# Patient Record
Sex: FEMALE | Race: WHITE | NOT HISPANIC OR LATINO | Employment: FULL TIME | URBAN - METROPOLITAN AREA
[De-identification: names, ages, dates, MRNs, and addresses within clinical notes are randomized per-mention and may not be internally consistent; named-entity substitution may affect disease eponyms.]

---

## 2017-05-11 ENCOUNTER — ALLSCRIPTS OFFICE VISIT (OUTPATIENT)
Dept: OTHER | Facility: OTHER | Age: 42
End: 2017-05-11

## 2017-05-15 ENCOUNTER — GENERIC CONVERSION - ENCOUNTER (OUTPATIENT)
Dept: OTHER | Facility: OTHER | Age: 42
End: 2017-05-15

## 2017-06-12 ENCOUNTER — GENERIC CONVERSION - ENCOUNTER (OUTPATIENT)
Dept: OTHER | Facility: OTHER | Age: 42
End: 2017-06-12

## 2017-07-10 ENCOUNTER — OFFICE VISIT (OUTPATIENT)
Dept: URGENT CARE | Facility: CLINIC | Age: 42
End: 2017-07-10
Payer: COMMERCIAL

## 2017-07-10 PROCEDURE — 99213 OFFICE O/P EST LOW 20 MIN: CPT

## 2017-09-14 ENCOUNTER — GENERIC CONVERSION - ENCOUNTER (OUTPATIENT)
Dept: OTHER | Facility: OTHER | Age: 42
End: 2017-09-14

## 2017-11-17 ENCOUNTER — TRANSCRIBE ORDERS (OUTPATIENT)
Dept: ADMINISTRATIVE | Facility: HOSPITAL | Age: 42
End: 2017-11-17

## 2017-11-17 DIAGNOSIS — J32.9 CHRONIC SINUSITIS, UNSPECIFIED LOCATION: Primary | ICD-10-CM

## 2017-11-22 ENCOUNTER — HOSPITAL ENCOUNTER (OUTPATIENT)
Dept: CT IMAGING | Facility: CLINIC | Age: 42
Discharge: HOME | End: 2017-11-22
Payer: COMMERCIAL

## 2017-11-22 DIAGNOSIS — J32.9 CHRONIC SINUSITIS, UNSPECIFIED LOCATION: ICD-10-CM

## 2017-11-22 PROCEDURE — 70486 CT MAXILLOFACIAL W/O DYE: CPT

## 2018-01-13 NOTE — MISCELLANEOUS
Message   Recorded as Task   Date: 01/30/2016 08:40 AM, Created By: Leigh Ann Ocampo   Task Name: Go to Result   Assigned To: Merline Estrada   Regarding Patient: AURELIO Ndiaye, Status: Active   Comment:    Fe Hong - 30 Jan 2016 8:40 AM     TASK CREATED  Please let the patient know that her chest x-ray was normal    02/01/2016 Patient notified  trb      Active Problems    1  Acute maxillary sinusitis (461 0) (J01 00)   2  Chronic pansinusitis (473 8) (J32 4)   3  Chronic sinusitis (473 9) (J32 9)   4  Cough variant asthma (493 82) (J45 991)   5  Eczema (692 9) (L30 9)   6  Esophageal reflux (530 81) (K21 9)   7  History of food allergy (V15 05) (Z91 018)    Current Meds   1  Doxycycline Hyclate 100 MG Oral Capsule; TAKE 1 CAPSULE TWICE DAILY WITH   MEALS; Therapy: 66VTC5520 to (Complete:73Jnh1710)  Requested for: 05QXE2856; Last   Rx:28Jan2016 Ordered   2  EpiPen 2-Crescencio 0 3 MG/0 3ML CRYSTAL; USE AS DIRECTED; Therapy: 77OQV8665 to (Last Rx:06Mar2014)  Requested for: 61LKR6544 Ordered   3  Fluticasone Propionate 50 MCG/ACT Nasal Suspension; USE 2 SPRAYS IN EACH   NOSTRIL ONCE DAILY; Therapy: 53XCJ4717 to (Evaluate:12Lmk3581); Last Rx:28Jan2016 Ordered   4  Mometasone Furoate 0 1 % External Cream (Elocon); APPLY SPARINGLY TO   AFFECTED AREAS TWICE DAILY  (AM AND PM); Therapy: 99ETJ2655 to (Evaluate:71Mds3387)  Requested for: 61UZO4149; Last   Rx:05Dec2015 Ordered   5  Qvar 40 MCG/ACT Inhalation Aerosol Solution; INHALE 2 PUFFS TWICE DAILY; Therapy: 06OXG2139 to (Last Rx:28Jan2016)  Requested for: 75ORR5008 Ordered   6  Ventolin  (90 Base) MCG/ACT Inhalation Aerosol Solution; INHALE 2 PUFFS   EVERY 4-6 HOURS AS NEEDED; Therapy: 55MCJ4605 to (Last Rx:28Jan2016)  Requested for: 39SKN2695 Ordered    Allergies    1  Aspirin TABS   2  Bacitracin OINT   3  Neosporin OINT   4  Penicillins    5  Eggs   6  Nuts   7  Shellfish    Signatures   Electronically signed by :  Kirill Redman, ; Feb 1 2016  5:21PM EST (Author)

## 2018-01-14 VITALS
SYSTOLIC BLOOD PRESSURE: 124 MMHG | TEMPERATURE: 99.5 F | WEIGHT: 152 LBS | HEIGHT: 62 IN | DIASTOLIC BLOOD PRESSURE: 64 MMHG | BODY MASS INDEX: 27.97 KG/M2 | HEART RATE: 68 BPM

## 2018-01-15 NOTE — MISCELLANEOUS
Requested for: 88Dcj9609 Ordered   6  ZyrTEC-D Allergy & Congestion 5-120 MG Oral Tablet Extended Release 12 Hour; TAKE 1   TABLET DAILY; Therapy: 25APK7920 to (Evaluate:66Pae1623); Last Rx:17Kjv7581 Ordered    Allergies    1  Aspirin TABS   2  Bacitracin OINT   3  Neosporin OINT   4  Penicillins    5  Eggs   6  Nuts   7   Shellfish    Signatures   Electronically signed by : Vicente Gonzalez DO; Oct  3 2016 11:06PM EST                       (Author)

## 2018-01-16 NOTE — PROGRESS NOTES
Assessment    1  Encounter for preventive health examination (V70 0) (Z00 00)    Plan  Allergic rhinitis, Moderate persistent asthma    · Fluticasone Propionate 50 MCG/ACT Nasal Suspension; USE 2 SPRAYS IN EACH  NOSTRIL ONCE DAILY   · Medrol 4 MG Oral Tablet Therapy Pack (MethylPREDNISolone); Take as directed  on pack instructions   · Symbicort 160-4 5 MCG/ACT Inhalation Aerosol; INHALE 2 PUFFS TWICE DAILY  RINSE MOUTH AFTER USE   · Tuan Bailey MD, Marcia Rodriges  (Allergy/Immunology) Physician Referral  Consult  Status: Active   Requested for: 42CCX8410  Care Summary provided  : Yes  Cough variant asthma, Moderate persistent asthma    · Qvar 80 MCG/ACT Inhalation Aerosol Solution  Dry skin dermatitis, Eczema    · 2 - Padmini RICHARDS, Sarahi Baez (Dermatology) Physician Referral  Consult  Status: Active   Requested for: 40RAX7386  Care Summary provided  : Yes  Eczema    · Mometasone Furoate 0 1 % External Cream (Elocon); APPLY SPARINGLY TO  AFFECTED AREAS TWICE DAILY  (AM AND PM)  Encounter for screening mammogram for breast cancer    · MAMMO SCREENING BILATERAL W 3D & CAD; Status:Active; Requested for:51Zkg3476; Health Maintenance    · EKG/ECG- POC; Status:Complete;   Done: 15LQB9138 09:19PM  Moderate persistent asthma    · Ventolin  (90 Base) MCG/ACT Inhalation Aerosol Solution; INHALE 2  PUFFS EVERY 4-6 HOURS AS NEEDED   · Follow-up visit in 3 weeks Evaluation and Treatment  Follow-up  Status: Hold For -  Scheduling  Requested for: 73Ego3295    Discussion/Summary  health maintenance visit Currently, she eats a healthy diet and has an adequate exercise regimen  the risks and benefits of cervical cancer screening were discussed cervical cancer screening is current cervical cancer screening is managed by Dr Ludivina Rdz Breast cancer screening: the risks and benefits of breast cancer screening were discussed and mammogram has been ordered   The risks and benefits of immunizations were discussed and patient declines immunizations  She was advised to be evaluated by an optometrist and a dentist  Advice and education were given regarding nutrition, weight bearing exercise, weight loss, calcium supplements, vitamin D supplements, sunscreen use, self skin examination, helmet use, seat belt use, fall risk reduction and advanced directive planning  The patient had a normal exam today in the office  Her EKG was normal  Her lab work was all normal as well  She will continue with her healthy diet and exercise  She is having worsening asthma and allergy symptoms  I'm going to start her on the Medrol Dosepak to control her acute allergy flare  We will change her maintenance inhaler to Symbicort as ordered  We will also restart her steroid nasal spray  I'm going to refer her to an allergist for further evaluation and treatment  We'll see her back as scheduled  Possible side effects of new medications were reviewed with the patient/guardian today  Chief Complaint  Complete Physical 45year old female -- Sees GYN for PAP/Pelvics      History of Present Illness  HM, Adult Female: The patient is being seen for a health maintenance evaluation  General Health: The patient's health since the last visit is described as good  She has regular dental visits  She denies vision problems  Vision care includes no need for vision correction  She denies hearing loss  Lifestyle:  She consumes a diverse and healthy diet  She does not have any weight concerns  She exercises regularly  She exercises less than three times a week  Reproductive health: the patient is premenopausal   she reports normal menses  Screening:   HPI: Figueroa Schafer is a 28-year-old female presents today for her complete physical  She has been having increased asthma symptoms over the last few weeks- it is worse in the humidity and around her dog  She has some nasal congestion and congestion  She will have to use the rescue inhaler several times to get it to work   She is not remembering to use her Qvar every day  She is having increased nighttime symptoms  She has not seen an allergist in the past  She did have allergy testing through ENT via the lab work in the past  She has severe nasal congestion  There are no fevers  She uses nasal spray  She was given prescription Flonase through her ENT  The patient denies any chest pain, shortness of breath, or palpitations  There is no edema  There are no headaches or visual changes  There is no lightheadedness, dizziness, or fainting spells  She sees her GYN regularly and she is due to see them  She sees ENT and she needs sinus surgery at some point  She sees Dr Raymond Fowler  Review of Systems    Constitutional: as noted in HPI  Eyes: as noted in HPI    ENT: as noted in HPI  Cardiovascular: as noted in HPI  Respiratory: as noted in HPI  Gastrointestinal: as noted in HPI  Genitourinary: as noted in HPI  Active Problems    1  Allergic rhinitis (477 9) (J30 9)   2  Blood tests for routine general physical examination (V72 62) (Z00 00)   3  Body mass index (BMI) of 25 0 to 29 9 (278 02) (E66 3)   4  Chronic pansinusitis (473 8) (J32 4)   5  Eczema (692 9) (L30 9)   6  Esophageal reflux (530 81) (K21 9)   7  History of food allergy (V15 05) (Z91 018)   8   Moderate persistent asthma (493 90) (J45 40)    Past Medical History    · History of Acute maxillary sinusitis (461 0) (J01 00)   · History of Cervical cancer screening (V76 2) (Z12 4)   · History of Encounter for routine pelvic examination (V72 31) (Z01 419)   · History of acute sinusitis (V12 69) (Z87 09)   · History of sinusitis (V12 69) (Z87 09)   · History of Influenza vaccine needed (V04 81) (Z23)   · History of Need for pneumococcal vaccine (V03 82) (Z23)    Surgical History    · Denied: History Of Prior Surgery    Family History  Mother    · Family history of Diabetes    Social History    · Daily caffeinated coffee consumption   · Former smoker (V15 82) (N91 220)   · quit 11/13/2015  ·    · No drug use   · Occasional alcohol use   · Three children    Current Meds   1  Qvar 80 MCG/ACT Inhalation Aerosol Solution; INHALE 2 PUFFS,  BY MOUTH, TWICE   DAILY; Therapy: 12HCA6553 to (Last Rx:48Omn1935)  Requested for: 07GVF6798 Ordered   2  Ventolin  (90 Base) MCG/ACT Inhalation Aerosol Solution; INHALE 2 PUFFS   EVERY 4-6 HOURS AS NEEDED; Therapy: 34UNP3418 to (Last Rx:28Jan2016)  Requested for: 43IHE8802 Ordered   3  ZyrTEC-D Allergy & Congestion 5-120 MG Oral Tablet Extended Release 12 Hour; TAKE 1   TABLET DAILY; Therapy: 77ORG7098 to (Evaluate:08Nov2016); Last Rx:79Mnv3681 Ordered    Allergies    1  Aspirin TABS   2  Bacitracin OINT   3  Neosporin OINT   4  Penicillins    5  Eggs   6  Nuts   7  Shellfish    Vitals   Recorded: 45Oqr6566 07:19PM Recorded: 50BZG5381 92:53CS   Systolic 835 819, LUE, Sitting   Diastolic 80 90, LUE, Sitting   Heart Rate 68 78, L Radial   Pulse Quality  Regular   Temperature  99 6 F, Tympanic   LMP  23-Aug-2016   O2 Saturation 98, RA    Height  5 ft 2 in   Weight  154 lb    BMI Calculated  28 17   BSA Calculated  1 71     Physical Exam    Constitutional   General appearance: No acute distress, well appearing and well nourished  Eyes   Conjunctiva and lids: No swelling, erythema or discharge  Pupils and irises: Equal, round, reactive to light  Ophthalmoscopic examination: Normal fundi and optic discs  Ears, Nose, Mouth, and Throat   External inspection of ears and nose: Normal     Otoscopic examination: Tympanic membranes translucent with normal light reflex  Canals patent without erythema  Hearing: Normal     Nasal mucosa, septum, and turbinates: Normal without edema or erythema  Lips, teeth, and gums: Normal, good dentition  Oropharynx: Normal with no erythema, edema, exudate or lesions  Neck   Neck: Supple, symmetric, trachea midline, no masses  Thyroid: Normal, no thyromegaly      Pulmonary   Respiratory effort: No increased work of breathing or signs of respiratory distress  Percussion of chest: Normal     Palpation of chest: Normal     Auscultation of lungs: Clear to auscultation  Auscultation of the lungs revealed no expiratory wheezing, normal expiratory time and no inspiratory wheezing  no rales or crackles were heard bilaterally  no rhonchi  no friction rub  no wheezing  no diminished breath sounds  no bronchial breath sounds  Cardiovascular   Palpation of heart: Normal PMI, no thrills  Auscultation of heart: Normal rate and rhythm, normal S1 and S2, no murmurs  The heart rate was normal  Heart sounds: normal S1, normal S2, no S3 and no S4  no murmurs were heard  Carotid pulses: 2+ bilaterally  Abdominal aorta: Normal     Femoral pulses: 2+ bilaterally  Pedal pulses: 2+ bilaterally  Examination of extremities for edema and/or varicosities: Normal     Chest   Breasts: Normal, no dimpling or skin changes appreciated  Palpation of breasts and axillae: Normal, no masses palpated  Abdomen   Abdomen: Non-tender, no masses  Bowel sounds were normal  The abdomen was soft and nontender  no masses palpated  Liver and spleen: No hepatomegaly or splenomegaly  Lymphatic   Palpation of lymph nodes in neck: No lymphadenopathy  Palpation of lymph nodes in axillae: No lymphadenopathy  Palpation of lymph nodes in groin: No lymphadenopathy  Palpation of lymph nodes in other areas: No lymphadenopathy  Musculoskeletal   Gait and station: Normal     Digits and nails: Normal without clubbing or cyanosis  Joints, bones, and muscles: Normal     Range of motion: Normal     Stability: Normal     Muscle strength/tone: Normal     Skin   Skin and subcutaneous tissue: Normal without rashes or lesions  Palpation of skin and subcutaneous tissue: Normal turgor  Neurologic   Cranial nerves: Cranial nerves II-XII intact  Reflexes: 2+ and symmetric  Sensation: No sensory loss  Psychiatric   Judgment and insight: Normal     Orientation to person, place, and time: Normal     Recent and remote memory: Intact  Mood and affect: Normal        Results/Data  (1) LIPID PANEL FASTING W DIRECT LDL REFLEX 01Crb0990 09:13PM Danny Keyes     Test Name Result Flag Reference   CHOLESTEROL 82     LDL CHOLESTEROL CALCULATED 108     TRIGLYCERIDES 63     HDL,DIRECT 61       A 12 lead ECG was performed and was normal  No acute ischemia  Rhythm and rate: ventricular rate is 81 beats per minute, but normal sinus rhythm  P-waves: AK interval is normal, but the P wave is normal    QRS: the QRS is normal    ST segment: the ST segments are normal    T waves: normal    Comparison to prior ECGs:  no interval change  Procedure    Procedure: Visual Acuity Test    Indication: routine screening  Inforrmation supplied by a Snellen chart     Results: 20/20 in both eyes without corrective device, 20/20 in the right eye without corrective device, 20/25 in the left eye without corrective device      Future Appointments    Date/Time Provider Specialty Site   10/04/2016 09:00 AM Danny Keyes DO [facility]     Signatures   Electronically signed by : Elijah Appiah DO; Sep 15 2016  9:23PM EST                       (Author)

## 2018-04-09 ENCOUNTER — OFFICE VISIT (OUTPATIENT)
Dept: FAMILY MEDICINE CLINIC | Facility: CLINIC | Age: 43
End: 2018-04-09
Payer: COMMERCIAL

## 2018-04-09 VITALS
HEART RATE: 80 BPM | SYSTOLIC BLOOD PRESSURE: 130 MMHG | WEIGHT: 162 LBS | TEMPERATURE: 99.6 F | BODY MASS INDEX: 30.58 KG/M2 | DIASTOLIC BLOOD PRESSURE: 80 MMHG | HEIGHT: 61 IN

## 2018-04-09 DIAGNOSIS — J03.90 PHARYNGOTONSILLITIS: Primary | ICD-10-CM

## 2018-04-09 DIAGNOSIS — J02.9 PHARYNGOTONSILLITIS: Primary | ICD-10-CM

## 2018-04-09 DIAGNOSIS — Z12.31 ENCOUNTER FOR SCREENING MAMMOGRAM FOR BREAST CANCER: ICD-10-CM

## 2018-04-09 PROBLEM — J01.40 ACUTE PANSINUSITIS, UNSPECIFIED: Status: ACTIVE | Noted: 2017-05-11

## 2018-04-09 PROCEDURE — 99213 OFFICE O/P EST LOW 20 MIN: CPT | Performed by: NURSE PRACTITIONER

## 2018-04-09 PROCEDURE — 87880 STREP A ASSAY W/OPTIC: CPT | Performed by: NURSE PRACTITIONER

## 2018-04-09 RX ORDER — METHYLPREDNISOLONE 4 MG/1
TABLET ORAL
Qty: 21 TABLET | Refills: 0 | Status: SHIPPED | OUTPATIENT
Start: 2018-04-09 | End: 2019-01-23 | Stop reason: ALTCHOICE

## 2018-04-09 RX ORDER — CLARITHROMYCIN 250 MG/1
250 TABLET, FILM COATED ORAL EVERY 12 HOURS SCHEDULED
Qty: 20 TABLET | Refills: 0 | Status: SHIPPED | OUTPATIENT
Start: 2018-04-09 | End: 2018-04-19

## 2018-04-09 RX ORDER — BUDESONIDE AND FORMOTEROL FUMARATE DIHYDRATE 160; 4.5 UG/1; UG/1
2 AEROSOL RESPIRATORY (INHALATION) 2 TIMES DAILY
COMMUNITY
Start: 2016-09-15 | End: 2019-05-08 | Stop reason: SDUPTHER

## 2018-04-09 NOTE — PATIENT INSTRUCTIONS

## 2018-04-09 NOTE — LETTER
April 9, 2018     Patient: Izzy Morton   YOB: 1975   Date of Visit: 4/9/2018       To Whom it May Concern:    Mery Barfield is under my professional care  She was seen in my office on 4/9/2018  She may return to work on 4/11/2018  If you have any questions or concerns, please don't hesitate to call           Sincerely,          ESTEFANIA Luz        CC: No Recipients

## 2018-04-09 NOTE — PROGRESS NOTES
Assessment/Plan   Diagnoses and all orders for this visit:    Pharyngotonsillitis  -     clarithromycin (BIAXIN) 250 mg tablet; Take 1 tablet (250 mg total) by mouth every 12 (twelve) hours for 10 days  -     Methylprednisolone 4 MG TBPK; Use as directed on package    Encounter for screening mammogram for breast cancer  -     Mammo screening bilateral w 3d & cad; Future    Other orders  -     budesonide-formoterol (SYMBICORT) 160-4 5 mcg/act inhaler; Inhale 2 puffs 2 (two) times a day        Chief Complaint   Patient presents with    Sore Throat     Sore throat, chills, cough with yellow mucous, and nasal mucous yellow for 1 week  Subjective   Patient ID: Ananth Leslie is a 40 y o  female  Vitals:    04/09/18 1303   BP: 130/80   Pulse: 80   Temp: 99 6 °F (37 6 °C)     Sore Throat    This is a new problem  Episode onset: 10 days  The problem has been waxing and waning  Neither side of throat is experiencing more pain than the other  Fever duration: chills no official temperture  The pain is at a severity of 4/10  The pain is mild  Associated symptoms include congestion, coughing, ear pain, a hoarse voice and swollen glands  Pertinent negatives include no abdominal pain, diarrhea, drooling, ear discharge, headaches, plugged ear sensation, neck pain, shortness of breath, stridor or trouble swallowing  She has tried cool liquids and acetaminophen for the symptoms  The treatment provided no relief  The following portions of the patient's history were reviewed and updated as appropriate: allergies, current medications, past family history, past social history, past surgical history and problem list     Review of Systems   Constitutional: Positive for chills, fatigue and fever  HENT: Positive for congestion, ear pain, hoarse voice and sore throat  Negative for drooling, ear discharge and trouble swallowing  Eyes: Negative  Negative for visual disturbance  Respiratory: Positive for cough   Negative for chest tightness, shortness of breath and stridor  Cardiovascular: Negative  Gastrointestinal: Negative  Negative for abdominal pain, diarrhea and nausea  Endocrine: Negative  Genitourinary: Negative  Musculoskeletal: Negative  Negative for neck pain  Skin: Negative  Allergic/Immunologic: Negative  Neurological: Negative  Negative for headaches  Hematological: Negative  Psychiatric/Behavioral: Negative  Objective     Physical Exam   Constitutional: She is oriented to person, place, and time  She appears well-developed and well-nourished  No distress  HENT:   Head: Normocephalic  Right Ear: Hearing and external ear normal  No swelling (inner canal redened, large amount of soft brown cerumen removed, canals appear irriitated) or tenderness  No mastoid tenderness  Tympanic membrane is not erythematous and not bulging  No middle ear effusion  Left Ear: Hearing, tympanic membrane and external ear normal  No swelling or tenderness  No mastoid tenderness  Tympanic membrane is not erythematous and not bulging  No middle ear effusion  Nose: Nose normal  No mucosal edema, rhinorrhea or sinus tenderness  Right sinus exhibits no maxillary sinus tenderness and no frontal sinus tenderness  Left sinus exhibits no maxillary sinus tenderness and no frontal sinus tenderness  Mouth/Throat: Uvula is midline  No uvula swelling  Posterior oropharyngeal erythema present  No oropharyngeal exudate or tonsillar abscesses  Tonsils are 3+ on the right  Tonsils are 3+ on the left  No tonsillar exudate  Eyes: Conjunctivae are normal  Pupils are equal, round, and reactive to light  Right eye exhibits no discharge  Left eye exhibits no discharge  Neck: Normal range of motion  Neck supple  No thyromegaly present  Cardiovascular: Normal rate, regular rhythm, normal heart sounds and intact distal pulses  No murmur heard    Pulmonary/Chest: Effort normal and breath sounds normal  No respiratory distress  She has no wheezes  Abdominal: Soft  There is no tenderness  Musculoskeletal: Normal range of motion  She exhibits no edema, tenderness or deformity  Lymphadenopathy:     She has cervical adenopathy (voice hoarse)  Neurological: She is alert and oriented to person, place, and time  Skin: Skin is warm and dry  Capillary refill takes less than 2 seconds  Psychiatric: She has a normal mood and affect   Her behavior is normal  Judgment and thought content normal

## 2018-04-13 ENCOUNTER — HOSPITAL ENCOUNTER (OUTPATIENT)
Dept: MAMMOGRAPHY | Facility: CLINIC | Age: 43
Discharge: HOME | End: 2018-04-13
Payer: COMMERCIAL

## 2018-04-13 DIAGNOSIS — Z12.31 ENCOUNTER FOR SCREENING MAMMOGRAM FOR BREAST CANCER: ICD-10-CM

## 2018-04-13 PROCEDURE — 77067 SCR MAMMO BI INCL CAD: CPT

## 2018-04-13 PROCEDURE — 77063 BREAST TOMOSYNTHESIS BI: CPT

## 2018-04-25 ENCOUNTER — HOSPITAL ENCOUNTER (OUTPATIENT)
Dept: ULTRASOUND IMAGING | Facility: CLINIC | Age: 43
Discharge: HOME | End: 2018-04-25
Payer: COMMERCIAL

## 2018-04-25 ENCOUNTER — HOSPITAL ENCOUNTER (OUTPATIENT)
Dept: MAMMOGRAPHY | Facility: CLINIC | Age: 43
Discharge: HOME | End: 2018-04-25
Payer: COMMERCIAL

## 2018-04-25 DIAGNOSIS — R92.8 ABNORMAL MAMMOGRAM: ICD-10-CM

## 2018-04-25 PROCEDURE — 77066 DX MAMMO INCL CAD BI: CPT

## 2018-04-25 PROCEDURE — G0279 TOMOSYNTHESIS, MAMMO: HCPCS

## 2018-04-25 PROCEDURE — 76642 ULTRASOUND BREAST LIMITED: CPT

## 2018-04-25 RX ORDER — ACETAMINOPHEN 325 MG/1
650 TABLET ORAL EVERY 6 HOURS PRN
COMMUNITY
End: 2019-02-13 | Stop reason: ALTCHOICE

## 2018-04-25 RX ORDER — IBUPROFEN 200 MG
TABLET ORAL EVERY 6 HOURS PRN
COMMUNITY
End: 2019-08-13

## 2018-04-25 NOTE — PROGRESS NOTES
Met with patient and Dr Lopez Nurse regarding recommendation for;      __x___ RIGHT ___x___LEFT      __x(L)___Ultrasound guided  __x(R)____Stereotactic  Breast biopsy  __x___Verbalized understanding        Blood thinners:  _____yes ___x__no    Date stopped: ____n/a_______    Biopsy teaching sheet given:  ____x___yes ______no

## 2018-05-01 ENCOUNTER — HOSPITAL ENCOUNTER (OUTPATIENT)
Dept: MAMMOGRAPHY | Facility: CLINIC | Age: 43
Discharge: HOME | End: 2018-05-01
Payer: COMMERCIAL

## 2018-05-01 ENCOUNTER — HOSPITAL ENCOUNTER (OUTPATIENT)
Dept: MAMMOGRAPHY | Facility: CLINIC | Age: 43
Discharge: HOME | End: 2018-05-01
Admitting: RADIOLOGY
Payer: COMMERCIAL

## 2018-05-01 ENCOUNTER — HOSPITAL ENCOUNTER (OUTPATIENT)
Dept: ULTRASOUND IMAGING | Facility: CLINIC | Age: 43
Discharge: HOME | End: 2018-05-01
Payer: COMMERCIAL

## 2018-05-01 VITALS — HEART RATE: 80 BPM | DIASTOLIC BLOOD PRESSURE: 82 MMHG | SYSTOLIC BLOOD PRESSURE: 130 MMHG

## 2018-05-01 DIAGNOSIS — R92.0 MAMMOGRAPHIC MICROCALCIFICATION: ICD-10-CM

## 2018-05-01 DIAGNOSIS — R92.8 ABNORMAL MAMMOGRAM: ICD-10-CM

## 2018-05-01 DIAGNOSIS — R92.8 ABNORMAL ULTRASOUND OF BREAST: ICD-10-CM

## 2018-05-01 PROCEDURE — 88341 IMHCHEM/IMCYTCHM EA ADD ANTB: CPT | Performed by: PATHOLOGY

## 2018-05-01 PROCEDURE — 19081 BX BREAST 1ST LESION STRTCTC: CPT

## 2018-05-01 PROCEDURE — 88305 TISSUE EXAM BY PATHOLOGIST: CPT | Performed by: PATHOLOGY

## 2018-05-01 PROCEDURE — 88361 TUMOR IMMUNOHISTOCHEM/COMPUT: CPT | Performed by: PATHOLOGY

## 2018-05-01 PROCEDURE — 19083 BX BREAST 1ST LESION US IMAG: CPT

## 2018-05-01 PROCEDURE — 88342 IMHCHEM/IMCYTCHM 1ST ANTB: CPT | Performed by: PATHOLOGY

## 2018-05-01 RX ORDER — LIDOCAINE HYDROCHLORIDE 10 MG/ML
5 INJECTION, SOLUTION INFILTRATION; PERINEURAL ONCE
Status: COMPLETED | OUTPATIENT
Start: 2018-05-01 | End: 2018-05-01

## 2018-05-01 RX ORDER — LIDOCAINE HYDROCHLORIDE AND EPINEPHRINE BITARTRATE 20; .01 MG/ML; MG/ML
10 INJECTION, SOLUTION SUBCUTANEOUS ONCE
Status: COMPLETED | OUTPATIENT
Start: 2018-05-01 | End: 2018-05-01

## 2018-05-01 RX ADMIN — LIDOCAINE HYDROCHLORIDE 5 ML: 10 INJECTION, SOLUTION INFILTRATION; PERINEURAL at 13:00

## 2018-05-01 RX ADMIN — LIDOCAINE HYDROCHLORIDE 5 ML: 10 INJECTION, SOLUTION INFILTRATION; PERINEURAL at 13:42

## 2018-05-01 RX ADMIN — LIDOCAINE HYDROCHLORIDE AND EPINEPHRINE 10 ML: 20; 10 INJECTION, SOLUTION INFILTRATION; PERINEURAL at 13:42

## 2018-05-01 NOTE — PROGRESS NOTES
Procedure type:    __x___ultrasound guided _____stereotactic    Breast:    ___x__Left _____Right    Location:100 6cmfn    Needle:12ga Minor    # of passes:2    Clip: Heart    Performed by: Dr Livier Zuniga  Pressure held for 5 minutes by:  Kayli PhillipsPCA)    Steri Strips:    ___X__yes _____no    Band aid:  ___X__yes_____no    Tape and guaze:    _____yes _X____no    Tolerated procedure:    __X___yes _____no

## 2018-05-01 NOTE — PROGRESS NOTES
Patient arrived via:    _x____ambulatory    _____wheelchair    _____stretcher      Domestic violence screen    ____x__negative______positive    Breast Implants:    _______yes _____x___no

## 2018-05-01 NOTE — PROGRESS NOTES
Ice pack given:     ___x__yes _____no    Discharge instructions signed by patient:    ___x__yes _____no    Discharge instructions given to patient:    __x___yes _____no    Discharged via:    __x___amulatory    _____wheelchair    _____stretcher    Stable on discharge:    ___x__yes ____no

## 2018-05-01 NOTE — DISCHARGE INSTR - LAB
POST LARGE CORE BREAST BIOPSY PATIENT INFORMATION      1  Place an ice pack inside your bra over the top of the dressing every hour for 20 minutes (20 minutes on, 60 minutes off) Do this until bedtime  2  Do not shower or bathe until the following morning       3  You may bathe your breast carefully with the steri-strips in place  Be careful    Not to loosen them  The steri-strips will fall off in 3-5 days  4  You may have mild discomfort, and you may have some bruising where the   Needle entered the skin  This should clear within 5-7 days  5  If you need medicine for discomfort, take acetaminophen products such as   Tylenol  You may also take Advil or Motrin products  6  Do not participate in strenuous activities such as-tennis, aerobics, skiing,  Weight lifting, etc  for 24 hours  Refrain from swimming for 72 hours  7  Wearing a bra for sleeping may be more comfortable for the first 24-48 hours  8  Watch for continued bleeding, pain or fever over 101     For procedures done at the [address] [place] [address] call:  Kj Trujillo RN at [phone] or  Dheeraj Araiza RN at [phone]  After 4 PM call the Interventional Radiology Department at [phone] and ask to speak with the nurse on call  For procedures performed at [address] [place] [address] call: The Radiology Nurse at [phone]    After 4 PM call your physician, or go to the Emergency Department  9          The final results of your biopsy are usually available within one week

## 2018-05-01 NOTE — PROGRESS NOTES
Procedure type:    _____ultrasound guided __x___stereotactic    Breast:    _____Left ____x_Right    Location:    Needle:8ga Revolve    # of passes: 10    Clip: Joe    Performed by: Dr Jen Rose    Pressure held for 5 minutes by:  Moisés PhillipsPCA)    Steri Strips:    __X___yes _____no    Band aid:  __X___yes_____no    Tape and guaze:    _X____yes _____no    Tolerated procedure:    ___X__yes _____no

## 2018-05-02 NOTE — PROGRESS NOTES
Post procedure call completed    Bleeding: _____yes __x___no    Pain: _____yes ____x__no    Redness/Swelling: ______yes ___x___no    Band aid removed: _____yes __x___no    Steri-Strips intact: ___x__yes _____no

## 2018-05-04 ENCOUNTER — TELEPHONE (OUTPATIENT)
Dept: MAMMOGRAPHY | Facility: CLINIC | Age: 43
End: 2018-05-04

## 2018-05-04 NOTE — PROGRESS NOTES
Patient Past Medical History:  Asthma          Allergies:  Aspirin, PCn, Bacitracin, Neosporin, Eggs, Nuts and shellfish        Past Breast History:     Previous Biopsy:   Yes______ No________      Breast: Right____ Left______       Malignant______ Benign_______      Treatments if applicable        Present Breast History:     Right_____x_____    Left______x_______     Density____x(left)_____    Calcifications___x(right)_________   Palpable______________      Patient notified of results on:  5/4/18    Date of Appointment with Surgeon 5/7/18 at [place] [address] with Dr Delores Christensen

## 2018-05-04 NOTE — TELEPHONE ENCOUNTER
The patient is scheduled to see Dr Valarie Dorman, breast surgeon in follow up for the +breast cancer on biopsy- she is scheduled on 05/07/2018 for a consult  This was scheduled by the [facility]- Dr Jordan Garduno advised her of the results

## 2018-05-07 ENCOUNTER — APPOINTMENT (OUTPATIENT)
Dept: LAB | Facility: CLINIC | Age: 43
End: 2018-05-07
Payer: COMMERCIAL

## 2018-05-07 ENCOUNTER — OFFICE VISIT (OUTPATIENT)
Dept: GYNECOLOGIC ONCOLOGY | Facility: CLINIC | Age: 43
End: 2018-05-07

## 2018-05-07 ENCOUNTER — OFFICE VISIT (OUTPATIENT)
Dept: SURGICAL ONCOLOGY | Facility: CLINIC | Age: 43
End: 2018-05-07
Payer: COMMERCIAL

## 2018-05-07 ENCOUNTER — TRANSCRIBE ORDERS (OUTPATIENT)
Dept: LAB | Facility: CLINIC | Age: 43
End: 2018-05-07

## 2018-05-07 VITALS
BODY MASS INDEX: 30.58 KG/M2 | WEIGHT: 162 LBS | TEMPERATURE: 98.1 F | RESPIRATION RATE: 16 BRPM | HEART RATE: 99 BPM | DIASTOLIC BLOOD PRESSURE: 70 MMHG | SYSTOLIC BLOOD PRESSURE: 130 MMHG | HEIGHT: 61 IN

## 2018-05-07 DIAGNOSIS — C50.412 MALIGNANT NEOPLASM OF UPPER-OUTER QUADRANT OF LEFT FEMALE BREAST, UNSPECIFIED ESTROGEN RECEPTOR STATUS (HCC): ICD-10-CM

## 2018-05-07 DIAGNOSIS — C50.412 MALIGNANT NEOPLASM OF UPPER-OUTER QUADRANT OF LEFT FEMALE BREAST, UNSPECIFIED ESTROGEN RECEPTOR STATUS (HCC): Primary | ICD-10-CM

## 2018-05-07 LAB — MISCELLANEOUS LAB TEST RESULT: NORMAL

## 2018-05-07 PROCEDURE — 99245 OFF/OP CONSLTJ NEW/EST HI 55: CPT | Performed by: SURGERY

## 2018-05-07 PROCEDURE — 36415 COLL VENOUS BLD VENIPUNCTURE: CPT

## 2018-05-07 NOTE — PROGRESS NOTES
Status:  Genetic testing pending, estimated turn around time: 2 weeks    Summary:    The patient was seen for genetic counseling regarding possible genetic testing, relative to her personal and family history of breast cancers  Family information was reviewed and a three generation pedigree drawn  The patient was recently diagnosed with breast cancer at the age of 40  The  family history is significant for a paternal aunt who was diagnosed with breast cancer at 62 and a paternal cousin who was diagnosed with breast cancer at 43  Please see pedigree for additional family history details  We reviewed the genetics of cancer, hereditary and familial risks, and the main benefits and limitation of genetic testing for susceptibility to cancer  We discussed hereditary cancer syndromes associated with breast cancers including BRCA1/2  We reviewed cancer risks associated with these genes, options for medical management, and potential risks for family members  Genetic Testing: We reviewed the genetic testing process, insurance concerns, and possible results  The patient elected to pursue genetic testing for genes associated with hereditary breast cancer  Informed consent was obtained and a DNA sample was collected  The sample was sent to [facility] for the STAT breast cancer panel  Test results should be available in approximately 2 weeks  The patient elected to have results disclosed by phone and she will be contacted once results are available

## 2018-05-07 NOTE — PROGRESS NOTES
Breast Consultation-Surgical Oncology     [facility]  [facility] [place]  [address] [place] [address]    Name:  Amparo Tong  YOB: 1975  MRN:  [id number]    Assessment/Plan   Diagnoses and all orders for this visit:    Malignant neoplasm of upper-outer quadrant of left female breast, unspecified estrogen receptor status ([address] )  -     Ambulatory referral to Genetics; Future        Assessment:left breast carcinoma    Plan:genetic testing    HPI: Amparo Tong is a 40y o  year old female who presents with a newly diagnosed left breast cancer  Pt denies any breast lumps, nipple drainage or skin changes  She had an abnormal screening mammogram on 18 which led to bilateral breast biopsies  Right breast biopsy was benign, left breast biopsy was IDC  Surgical treatment to date consisted of n/a  Oncology History:     No history exists  Pertinent reproductive history:  Age at menarche:  16  OB History      Para Term  AB Living    3 3            SAB TAB Ectopic Multiple Live Births                     Obstetric Comments    Age at menarche 16  Age at first pregnancy 22        Age at first live birth:  22  Age at menopause:    Hysterectomy/Oophrectomy:  No  Hormone replacement therapy:  No  Birth control pills:  Yes    Problem List:   Patient Active Problem List   Diagnosis    Acute pansinusitis, unspecified    Dry skin dermatitis    Eczema    Esophageal reflux    Moderate persistent asthma     No past medical history on file  Past Surgical History:   Procedure Laterality Date    BREAST BIOPSY Right 2018    Benign    BREAST BIOPSY Left 2018    IDC     No family history on file  Social History     Social History    Marital status: /Civil Union     Spouse name: N/A    Number of children: N/A    Years of education: N/A     Occupational History    Not on file       Social History Main Topics    Smoking status: Former Smoker    Smokeless tobacco: Never Used    Alcohol use Not on file    Drug use: Unknown    Sexual activity: Not on file     Other Topics Concern    Not on file     Social History Narrative    No narrative on file     Current Outpatient Prescriptions   Medication Sig Dispense Refill    budesonide-formoterol (SYMBICORT) 160-4 5 mcg/act inhaler Inhale 2 puffs 2 (two) times a day      acetaminophen (TYLENOL) 325 mg tablet Take 650 mg by mouth every 6 (six) hours as needed for mild pain      ibuprofen (MOTRIN) 200 mg tablet Take by mouth every 6 (six) hours as needed for mild pain      Methylprednisolone 4 MG TBPK Use as directed on package 21 tablet 0     No current facility-administered medications for this visit  Allergies   Allergen Reactions    Eggs Or Egg-Derived Products Anaphylaxis    Nuts Anaphylaxis    Shellfish Allergy Anaphylaxis    Penicillins Vomiting    Aspirin Rash    Bacitracin Rash    Neomycin-Bacitracin Zn-Polymyx Rash         The following portions of the patient's history were reviewed and updated as appropriate: allergies, current medications, past family history, past medical history, past social history, past surgical history and problem list     Review of Systems:  Review of Systems   Constitutional: Negative  Negative for appetite change and fever  Eyes: Negative  Respiratory: Negative for shortness of breath  Cardiovascular: Negative  Gastrointestinal: Negative  Endocrine: Negative  Genitourinary: Negative  Musculoskeletal: Negative  Negative for arthralgias and myalgias  Skin: Negative  Allergic/Immunologic: Negative  Neurological: Negative  Hematological: Negative  Negative for adenopathy  Does not bruise/bleed easily  Psychiatric/Behavioral: Negative  Physical Exam:  Physical Exam   Constitutional: She is oriented to person, place, and time  She appears well-developed and well-nourished     HENT:   Head: Normocephalic and atraumatic  Cardiovascular: Normal heart sounds  Pulmonary/Chest: Breath sounds normal  Right breast exhibits skin change (ecchymosis at biopsy site)  Right breast exhibits no inverted nipple, no mass, no nipple discharge and no tenderness  Left breast exhibits skin change (well healed biopsy site)  Left breast exhibits no inverted nipple, no mass, no nipple discharge and no tenderness  Abdominal: Soft  Lymphadenopathy:        Right axillary: No pectoral and no lateral adenopathy present  Left axillary: No pectoral and no lateral adenopathy present  Right: No supraclavicular adenopathy present  Left: No supraclavicular adenopathy present  Neurological: She is alert and oriented to person, place, and time  Psychiatric: She has a normal mood and affect  Laboratory:      Pathology revealed: invasive ductal carcinoma    Histologic grade: high grade     Angiolymphatic invasion:  absent    Tumor node status: clinically Negative    Hormone receptor status:  pending        Imagin18 3D Rashid screening mammogram,  B0 (3)  18 3D Rashid dx mammogram, left breast US, B4 (3)- Right calcs and left persistent density with suspicious mass  18 right breast stereotactic breast biopsy, benign  18 left breast US guided core biopsy, IDC        Treatment options include:  Lumpectomy vs mastectomy    Discussion/Summary:  61-year-old female who presents today with a newly diagnosed carcinoma of the left breast   This was detected on a baseline screening mammogram   She does not have any family history of breast cancer  She denies any masses or other concerns  Her exam is non revealing today  I reviewed these findings with her and her  who accompanies her  Based on her examination and imaging, she would be a good candidate for breast conservation    However, given her young age, I am recommending that she meet with our genetic counselor to discuss genetic testing  She is indeed interested in doing this  Should she be a gene carrier, we discussed the role of mastectomy as well as contralateral prophylactic mastectomy  In this situation, I would refer her to plastic surgery to discuss reconstruction  She understands that if she does have breast conservation this would include radiation therapy  We briefly discussed the different radiation modalities  She also understands that in addition to any surgery and radiation that she would need adjuvant medical therapy, which will be dictated by her surgical pathology and receptor status  All of her questions were answered today  I will make arrangements for her genetic counseling session  Once I receive these results, I will call her and we will then discuss definitive surgical plans

## 2018-05-07 NOTE — LETTER
May 7, 2018     Leigh Monet, [address] [address]    Patient: Marylen Bending   YOB: 1975   Date of Visit: 2018       Dear Dr Deniz Galarza: Thank you for referring Karolynn Essex to me for evaluation  Below are my notes for this consultation  If you have questions, please do not hesitate to call me  I look forward to following your patient along with you  Sincerely,        Raj Hernandez MD        CC: No Recipients  Raj Hernandez MD  2018  9:04 AM  Sign at close encounter    Breast Consultation-Surgical Oncology     [address]  [address]    Name:  Marylen Bending  YOB: 1975  MRN:  [id number]    Assessment/Plan   Diagnoses and all orders for this visit:    Malignant neoplasm of upper-outer quadrant of left female breast, unspecified estrogen receptor status ([facility] [address] )  -     Ambulatory referral to Genetics; Future        Assessment:left breast carcinoma    Plan:genetic testing    HPI: Marylen Bending is a 40y o  year old female who presents with a newly diagnosed left breast cancer  Pt denies any breast lumps, nipple drainage or skin changes  She had an abnormal screening mammogram on 18 which led to bilateral breast biopsies  Right breast biopsy was benign, left breast biopsy was IDC  Surgical treatment to date consisted of n/a  Oncology History:     No history exists  Pertinent reproductive history:  Age at menarche:  16  OB History      Para Term  AB Living    3 3            SAB TAB Ectopic Multiple Live Births                     Obstetric Comments    Age at menarche 16    Age at first pregnancy 22        Age at first live birth:  22  Age at menopause:    Hysterectomy/Oophrectomy:  No  Hormone replacement therapy:  No  Birth control pills:  Yes    Problem List:   Patient Active Problem List   Diagnosis    Acute pansinusitis, unspecified  Dry skin dermatitis    Eczema    Esophageal reflux    Moderate persistent asthma     No past medical history on file  Past Surgical History:   Procedure Laterality Date    BREAST BIOPSY Right 05/01/2018    Benign    BREAST BIOPSY Left 05/01/2018    IDC     No family history on file  Social History     Social History    Marital status: /Civil Union     Spouse name: N/A    Number of children: N/A    Years of education: N/A     Occupational History    Not on file  Social History Main Topics    Smoking status: Former Smoker    Smokeless tobacco: Never Used    Alcohol use Not on file    Drug use: Unknown    Sexual activity: Not on file     Other Topics Concern    Not on file     Social History Narrative    No narrative on file     Current Outpatient Prescriptions   Medication Sig Dispense Refill    budesonide-formoterol (SYMBICORT) 160-4 5 mcg/act inhaler Inhale 2 puffs 2 (two) times a day      acetaminophen (TYLENOL) 325 mg tablet Take 650 mg by mouth every 6 (six) hours as needed for mild pain      ibuprofen (MOTRIN) 200 mg tablet Take by mouth every 6 (six) hours as needed for mild pain      Methylprednisolone 4 MG TBPK Use as directed on package 21 tablet 0     No current facility-administered medications for this visit  Allergies   Allergen Reactions    Eggs Or Egg-Derived Products Anaphylaxis    Nuts Anaphylaxis    Shellfish Allergy Anaphylaxis    Penicillins Vomiting    Aspirin Rash    Bacitracin Rash    Neomycin-Bacitracin Zn-Polymyx Rash         The following portions of the patient's history were reviewed and updated as appropriate: allergies, current medications, past family history, past medical history, past social history, past surgical history and problem list     Review of Systems:  Review of Systems   Constitutional: Negative  Negative for appetite change and fever  Eyes: Negative  Respiratory: Negative for shortness of breath      Cardiovascular: Negative  Gastrointestinal: Negative  Endocrine: Negative  Genitourinary: Negative  Musculoskeletal: Negative  Negative for arthralgias and myalgias  Skin: Negative  Allergic/Immunologic: Negative  Neurological: Negative  Hematological: Negative  Negative for adenopathy  Does not bruise/bleed easily  Psychiatric/Behavioral: Negative  Physical Exam:  Physical Exam   Constitutional: She is oriented to person, place, and time  She appears well-developed and well-nourished  HENT:   Head: Normocephalic and atraumatic  Cardiovascular: Normal heart sounds  Pulmonary/Chest: Breath sounds normal  Right breast exhibits skin change (ecchymosis at biopsy site)  Right breast exhibits no inverted nipple, no mass, no nipple discharge and no tenderness  Left breast exhibits skin change (well healed biopsy site)  Left breast exhibits no inverted nipple, no mass, no nipple discharge and no tenderness  Abdominal: Soft  Lymphadenopathy:        Right axillary: No pectoral and no lateral adenopathy present  Left axillary: No pectoral and no lateral adenopathy present  Right: No supraclavicular adenopathy present  Left: No supraclavicular adenopathy present  Neurological: She is alert and oriented to person, place, and time  Psychiatric: She has a normal mood and affect         Laboratory:      Pathology revealed: invasive ductal carcinoma    Histologic grade: high grade     Angiolymphatic invasion:  absent    Tumor node status: clinically Negative    Hormone receptor status:  pending        Imagin18 3D Rashid screening mammogram,  B0 (3)  18 3D Rashid dx mammogram, left breast US, B4 (3)- Right calcs and left persistent density with suspicious mass  18 right breast stereotactic breast biopsy, benign  18 left breast US guided core biopsy, IDC        Treatment options include:  Lumpectomy vs mastectomy    Discussion/Summary:  16-year-old female who presents today with a newly diagnosed carcinoma of the left breast   This was detected on a baseline screening mammogram   She does not have any family history of breast cancer  She denies any masses or other concerns  Her exam is non revealing today  I reviewed these findings with her and her  who accompanies her  Based on her examination and imaging, she would be a good candidate for breast conservation  However, given her young age, I am recommending that she meet with our genetic counselor to discuss genetic testing  She is indeed interested in doing this  Should she be a gene carrier, we discussed the role of mastectomy as well as contralateral prophylactic mastectomy  In this situation, I would refer her to plastic surgery to discuss reconstruction  She understands that if she does have breast conservation this would include radiation therapy  We briefly discussed the different radiation modalities  She also understands that in addition to any surgery and radiation that she would need adjuvant medical therapy, which will be dictated by her surgical pathology and receptor status  All of her questions were answered today  I will make arrangements for her genetic counseling session  Once I receive these results, I will call her and we will then discuss definitive surgical plans

## 2018-05-14 ENCOUNTER — DOCUMENTATION (OUTPATIENT)
Dept: GYNECOLOGIC ONCOLOGY | Facility: CLINIC | Age: 43
End: 2018-05-14

## 2018-05-14 NOTE — PROGRESS NOTES
Genetic Testing Results  Genetic testing results are NEGATIVE for mutations and large rearrangements in:  BEBETO, BRCA1, BRCA2, CDH1, CHEK2, PALB2, PTEN, STK11, and TP53 ([facility] Breast [facility] STAT panel)      Discussion  Genetic testing results were disclosed to the patient  No mutations were identified in any of the hereditarybreast cancer genes tested  We discussed that while reassuring, these results do not explain the history of cancer in the family, and it is possible that an unidentifiable mutation or a mutation in another gene(s) is leading to an increased risk of cancer in the family  Therefore it is important that the patient and family members maintain regular cancer screening as directed by their physicians  The patients questions were answered and she was encouraged to call with any future questions or concerns

## 2018-07-12 ENCOUNTER — OFFICE VISIT (OUTPATIENT)
Dept: URGENT CARE | Facility: CLINIC | Age: 43
End: 2018-07-12
Payer: COMMERCIAL

## 2018-07-12 VITALS
BODY MASS INDEX: 30.21 KG/M2 | SYSTOLIC BLOOD PRESSURE: 122 MMHG | DIASTOLIC BLOOD PRESSURE: 78 MMHG | WEIGHT: 160 LBS | HEART RATE: 82 BPM | HEIGHT: 61 IN | OXYGEN SATURATION: 98 % | TEMPERATURE: 99.9 F | RESPIRATION RATE: 16 BRPM

## 2018-07-12 DIAGNOSIS — B02.9 HERPES ZOSTER WITHOUT COMPLICATION: Primary | ICD-10-CM

## 2018-07-12 PROCEDURE — G0382 LEV 3 HOSP TYPE B ED VISIT: HCPCS | Performed by: PREVENTIVE MEDICINE

## 2018-07-12 PROCEDURE — S9083 URGENT CARE CENTER GLOBAL: HCPCS | Performed by: PREVENTIVE MEDICINE

## 2018-07-12 RX ORDER — VALACYCLOVIR HYDROCHLORIDE 500 MG/1
1000 TABLET, FILM COATED ORAL 3 TIMES DAILY
Qty: 21 TABLET | Refills: 0 | Status: SHIPPED | OUTPATIENT
Start: 2018-07-12 | End: 2019-01-23 | Stop reason: ALTCHOICE

## 2018-07-12 NOTE — PROGRESS NOTES
[facility]        NAME: Izzy Morton is a 40 y o  female  : 1975    MRN: [id number]  DATE: 2018  TIME: 5:33 PM    Assessment and Plan   Herpes zoster without complication [L44 6]  1  Herpes zoster without complication  valACYclovir (VALTREX) 500 mg tablet         Patient Instructions       Follow up with PCP in 3-5 days  Proceed to  ER if symptoms worsen  Chief Complaint     Chief Complaint   Patient presents with    Rash     Left flank  Began yesterday  History of Present Illness       Recently discovered breast cancer, had lumpectomy, now getting ready for chemo  Last couple days she has noted a very painful rash along the left flank  Review of Systems   Review of Systems   Skin: Positive for rash           Current Medications       Current Outpatient Prescriptions:     acetaminophen (TYLENOL) 325 mg tablet, Take 650 mg by mouth every 6 (six) hours as needed for mild pain, Disp: , Rfl:     budesonide-formoterol (SYMBICORT) 160-4 5 mcg/act inhaler, Inhale 2 puffs 2 (two) times a day, Disp: , Rfl:     ibuprofen (MOTRIN) 200 mg tablet, Take by mouth every 6 (six) hours as needed for mild pain, Disp: , Rfl:     Methylprednisolone 4 MG TBPK, Use as directed on package, Disp: 21 tablet, Rfl: 0    valACYclovir (VALTREX) 500 mg tablet, Take 2 tablets (1,000 mg total) by mouth 3 (three) times a day for 7 days, Disp: 21 tablet, Rfl: 0    Current Allergies     Allergies as of 2018 - Reviewed 2018   Allergen Reaction Noted    Eggs or egg-derived products Anaphylaxis 2012    Nuts Anaphylaxis 2012    Shellfish allergy Anaphylaxis 2012    Penicillins Vomiting 2012    Aspirin Rash     Bacitracin Rash 2014    Neomycin-bacitracin zn-polymyx Rash 2014            The following portions of the patient's history were reviewed and updated as appropriate: allergies, current medications, past family history, past medical history, past social history, past surgical history and problem list      No past medical history on file  Past Surgical History:   Procedure Laterality Date    BREAST BIOPSY Right 05/01/2018    Benign    BREAST BIOPSY Left 05/01/2018    IDC    SEPTOPLASTY  01/04/2018       No family history on file  Medications have been verified  Objective   /78   Pulse 82   Temp 99 9 °F (37 7 °C)   Resp 16   Ht 5' 1" (1 549 m)   Wt 72 6 kg (160 lb)   SpO2 98%   BMI 30 23 kg/m²        Physical Exam     Physical Exam   Skin:   Papular erythematous rash in a dermatome pattern along the left flank  It starts at the middle with spine and goes toward the flank

## 2018-07-12 NOTE — PATIENT INSTRUCTIONS
If you have pain you may take whenever pills he been using for mild pain  Severe pain you can use and narcotic you have  If you get any kind of further complications from this rash you should see your family doctor

## 2019-01-23 ENCOUNTER — OFFICE VISIT (OUTPATIENT)
Dept: FAMILY MEDICINE CLINIC | Facility: CLINIC | Age: 44
End: 2019-01-23
Payer: COMMERCIAL

## 2019-01-23 VITALS
WEIGHT: 149 LBS | HEIGHT: 61 IN | DIASTOLIC BLOOD PRESSURE: 80 MMHG | BODY MASS INDEX: 28.13 KG/M2 | TEMPERATURE: 98.7 F | HEART RATE: 83 BPM | SYSTOLIC BLOOD PRESSURE: 120 MMHG

## 2019-01-23 DIAGNOSIS — J01.40 ACUTE NON-RECURRENT PANSINUSITIS: Primary | ICD-10-CM

## 2019-01-23 PROCEDURE — 99213 OFFICE O/P EST LOW 20 MIN: CPT | Performed by: FAMILY MEDICINE

## 2019-01-23 RX ORDER — RANITIDINE 150 MG/1
150 CAPSULE ORAL
COMMUNITY
End: 2019-11-26

## 2019-01-23 RX ORDER — CEFUROXIME AXETIL 500 MG/1
500 TABLET ORAL EVERY 12 HOURS SCHEDULED
Qty: 20 TABLET | Refills: 0 | Status: SHIPPED | OUTPATIENT
Start: 2019-01-23 | End: 2019-02-02

## 2019-01-23 RX ORDER — PROCHLORPERAZINE MALEATE 10 MG
10 TABLET ORAL
COMMUNITY
End: 2019-05-08 | Stop reason: ALTCHOICE

## 2019-01-23 RX ORDER — PREDNISONE 10 MG/1
TABLET ORAL
Qty: 21 TABLET | Refills: 0 | Status: SHIPPED | OUTPATIENT
Start: 2019-01-23 | End: 2019-02-13 | Stop reason: ALTCHOICE

## 2019-01-23 NOTE — PROGRESS NOTES
Assessment/Plan:     Diagnoses and all orders for this visit:    Acute non-recurrent pansinusitis  -     cefuroxime (CEFTIN) 500 mg tablet; Take 1 tablet (500 mg total) by mouth every 12 (twelve) hours for 10 days  -     predniSONE 10 mg tablet; 6 tabs on Day 1,5 tabs on Day 2,4 tabs on Day 3,3 tabs on Day 4,2 tabs on  Day 5And 1 tab on Day 6    Other orders  -     ranitidine (ZANTAC) 150 MG capsule; Take 150 mg by mouth  -     prochlorperazine (COMPAZINE) 10 mg tablet; Take 10 mg by mouth            Subjective:     Chief Complaint   Patient presents with    Facial Pain     Sinus pressure for 2 weeks        Patient ID: Billie Matthew is a 44 y o  female  Here for sinus infection x 2 weeks  Patient with recent treatment for breast cancer  State prior sinus surgery and had been doing well  +congestion, pain in sinuses, cough  Pressure in face        The following portions of the patient's history were reviewed and updated as appropriate: allergies, current medications, past family history, past medical history, past social history, past surgical history and problem list     Review of Systems   Constitutional: Negative  HENT: Positive for congestion, sinus pain and sinus pressure  Eyes: Negative  Respiratory: Negative  Cardiovascular: Negative  Gastrointestinal: Negative  Endocrine: Negative  Genitourinary: Negative  Musculoskeletal: Negative  Skin: Negative  Allergic/Immunologic: Negative  Neurological: Negative  Hematological: Negative  Psychiatric/Behavioral: Negative  All other systems reviewed and are negative  Objective:    Vitals:    01/23/19 0931   BP: 120/80   BP Location: Right arm   Patient Position: Sitting   Cuff Size: Standard   Pulse: 83   Temp: 98 7 °F (37 1 °C)   TempSrc: Tympanic   Weight: 67 6 kg (149 lb)   Height: 5' 1" (1 549 m)          Physical Exam   Constitutional: She is oriented to person, place, and time   She appears well-developed and well-nourished  HENT:   Head: Normocephalic and atraumatic  Right Ear: External ear normal    Left Ear: External ear normal    Mouth/Throat: Oropharynx is clear and moist    Pain and pressure to face b/l   Eyes: Pupils are equal, round, and reactive to light  Conjunctivae and EOM are normal    Neck: Normal range of motion  Cardiovascular: Normal rate, regular rhythm and normal heart sounds  Pulmonary/Chest: Effort normal and breath sounds normal    Abdominal: Soft  Bowel sounds are normal    Musculoskeletal: Normal range of motion  Neurological: She is alert and oriented to person, place, and time  She has normal reflexes  Skin: Skin is warm and dry  Psychiatric: She has a normal mood and affect  Her behavior is normal  Judgment and thought content normal    Nursing note and vitals reviewed

## 2019-02-13 ENCOUNTER — OFFICE VISIT (OUTPATIENT)
Dept: FAMILY MEDICINE CLINIC | Facility: CLINIC | Age: 44
End: 2019-02-13
Payer: COMMERCIAL

## 2019-02-13 VITALS
TEMPERATURE: 99 F | OXYGEN SATURATION: 99 % | DIASTOLIC BLOOD PRESSURE: 76 MMHG | HEIGHT: 61 IN | BODY MASS INDEX: 27.53 KG/M2 | HEART RATE: 111 BPM | WEIGHT: 145.8 LBS | SYSTOLIC BLOOD PRESSURE: 128 MMHG

## 2019-02-13 DIAGNOSIS — J01.01 ACUTE RECURRENT MAXILLARY SINUSITIS: Primary | ICD-10-CM

## 2019-02-13 PROCEDURE — 3008F BODY MASS INDEX DOCD: CPT | Performed by: NURSE PRACTITIONER

## 2019-02-13 PROCEDURE — 99213 OFFICE O/P EST LOW 20 MIN: CPT | Performed by: NURSE PRACTITIONER

## 2019-02-13 RX ORDER — DOXYCYCLINE 100 MG/1
100 TABLET ORAL 2 TIMES DAILY
Qty: 14 TABLET | Refills: 0 | Status: SHIPPED | OUTPATIENT
Start: 2019-02-13 | End: 2019-02-20

## 2019-02-13 RX ORDER — METHYLPREDNISOLONE 4 MG/1
TABLET ORAL
Qty: 21 EACH | Refills: 0 | Status: SHIPPED | OUTPATIENT
Start: 2019-02-13 | End: 2019-05-08 | Stop reason: ALTCHOICE

## 2019-02-13 RX ORDER — LORATADINE 10 MG/1
10 TABLET ORAL DAILY
COMMUNITY
End: 2019-05-08 | Stop reason: ALTCHOICE

## 2019-02-13 NOTE — PATIENT INSTRUCTIONS

## 2019-02-13 NOTE — PROGRESS NOTES
Assessment/Plan   Diagnoses and all orders for this visit:    Acute recurrent maxillary sinusitis  -     doxycycline (ADOXA) 100 MG tablet; Take 1 tablet (100 mg total) by mouth 2 (two) times a day for 7 days  -     methylPREDNISolone 4 MG tablet therapy pack; Use as directed on package    Other orders  -     Ferrous Bisglycinate Chelate 15 MG TABS; Take 1 tablet by mouth daily  -     loratadine (CLARITIN) 10 mg tablet; Take 10 mg by mouth daily        Chief Complaint   Patient presents with    Sinus Problem     previuiosly seen for sinus infection, never went away  Subjective   Patient ID: Delmis Marie is a 44 y o  female  Vitals:    02/13/19 1503   BP: 128/76   Pulse: (!) 111   Temp: 99 °F (37 2 °C)   SpO2: 99%     Sinusitis   This is a recurrent problem  The current episode started 1 to 4 weeks ago (Treated 1/23/2019 for sinusitis states as soon as antibiotic was completed congestion sinus the the began again)  The problem has been gradually worsening since onset  There has been no fever  Her pain is at a severity of 7/10  The pain is moderate  Associated symptoms include chills, congestion, headaches and sinus pressure  Pertinent negatives include no coughing, ear pain, neck pain, shortness of breath, sore throat or swollen glands  Past treatments include nothing  The treatment provided no relief  The following portions of the patient's history were reviewed and updated as appropriate: allergies, current medications, past medical history, past social history, past surgical history and problem list     Review of Systems   Constitutional: Positive for chills  HENT: Positive for congestion, sinus pressure and sinus pain  Negative for ear pain and sore throat  Facial swelling: facial pain  Eyes: Negative  Respiratory: Negative  Negative for cough and shortness of breath  Cardiovascular: Negative  Gastrointestinal: Negative  Endocrine: Negative  Genitourinary: Negative  Musculoskeletal: Negative  Negative for neck pain  Skin: Negative  Allergic/Immunologic: Negative  Neurological: Positive for headaches  Hematological: Negative  Psychiatric/Behavioral: Negative  Objective     Physical Exam   Constitutional: She is oriented to person, place, and time  She appears well-developed and well-nourished  No distress  HENT:   Head: Normocephalic and atraumatic  Right Ear: Tympanic membrane is bulging  Tympanic membrane is not erythematous  A middle ear effusion (Translucent tympanic membranes, bulging clear fluid) is present  No decreased hearing is noted  Left Ear: Tympanic membrane is bulging  Tympanic membrane is not erythematous  A middle ear effusion is present  No decreased hearing is noted  Nose: Mucosal edema ( red and swollen nasal turbinates), rhinorrhea and sinus tenderness present  Right sinus exhibits maxillary sinus tenderness  Left sinus exhibits maxillary sinus tenderness  Mouth/Throat: Uvula is midline and mucous membranes are normal  Posterior oropharyngeal erythema present  No oropharyngeal exudate, posterior oropharyngeal edema or tonsillar abscesses  Tonsils are 1+ on the right  Tonsils are 1+ on the left  No tonsillar exudate  Eyes: Conjunctivae are normal  Right eye exhibits no discharge  Left eye exhibits no discharge  Neck: Normal range of motion  Neck supple  No thyromegaly present  Cardiovascular: Normal rate, regular rhythm and normal heart sounds  No murmur heard  Pulmonary/Chest: Effort normal and breath sounds normal  No stridor  No respiratory distress  Abdominal: Soft  Bowel sounds are normal    Musculoskeletal: Normal range of motion  She exhibits no edema or tenderness  Lymphadenopathy:     She has no cervical adenopathy  Neurological: She is alert and oriented to person, place, and time  Skin: Skin is warm and dry  No rash noted  She is not diaphoretic  No erythema     Psychiatric: She has a normal mood and affect  Her behavior is normal  Judgment and thought content normal    Nursing note and vitals reviewed      Allergies   Allergen Reactions    Eggs Or Egg-Derived Products Anaphylaxis    Nuts Anaphylaxis    Shellfish Allergy Anaphylaxis    Penicillins Vomiting    Aspirin Rash    Bacitracin Rash    Neomycin-Bacitracin Zn-Polymyx Rash     Patient Active Problem List   Diagnosis    Acute pansinusitis, unspecified    Dry skin dermatitis    Eczema    Esophageal reflux    Moderate persistent asthma    Malignant neoplasm of upper-outer quadrant of left female breast (HCC)       Current Outpatient Medications:     budesonide-formoterol (SYMBICORT) 160-4 5 mcg/act inhaler, Inhale 2 puffs 2 (two) times a day, Disp: , Rfl:     Ferrous Bisglycinate Chelate 15 MG TABS, Take 1 tablet by mouth daily, Disp: , Rfl:     ibuprofen (MOTRIN) 200 mg tablet, Take by mouth every 6 (six) hours as needed for mild pain, Disp: , Rfl:     loratadine (CLARITIN) 10 mg tablet, Take 10 mg by mouth daily, Disp: , Rfl:     ranitidine (ZANTAC) 150 MG capsule, Take 150 mg by mouth, Disp: , Rfl:     doxycycline (ADOXA) 100 MG tablet, Take 1 tablet (100 mg total) by mouth 2 (two) times a day for 7 days, Disp: 14 tablet, Rfl: 0    methylPREDNISolone 4 MG tablet therapy pack, Use as directed on package, Disp: 21 each, Rfl: 0    prochlorperazine (COMPAZINE) 10 mg tablet, Take 10 mg by mouth, Disp: , Rfl:   Social History     Socioeconomic History    Marital status: /Civil Union     Spouse name: Not on file    Number of children: Not on file    Years of education: Not on file    Highest education level: Not on file   Occupational History    Not on file   Social Needs    Financial resource strain: Not on file    Food insecurity:     Worry: Not on file     Inability: Not on file    Transportation needs:     Medical: Not on file     Non-medical: Not on file   Tobacco Use    Smoking status: Former Smoker    Smokeless tobacco: Never Used   Substance and Sexual Activity    Alcohol use: Yes     Comment: 5-6 drinks per week    Drug use: Not on file    Sexual activity: Not on file   Lifestyle    Physical activity:     Days per week: Not on file     Minutes per session: Not on file    Stress: Not on file   Relationships    Social connections:     Talks on phone: Not on file     Gets together: Not on file     Attends [employer] service: Not on file     Active member of club or organization: Not on file     Attends meetings of clubs or organizations: Not on file     Relationship status: Not on file    Intimate partner violence:     Fear of current or ex partner: Not on file     Emotionally abused: Not on file     Physically abused: Not on file     Forced sexual activity: Not on file   Other Topics Concern    Not on file   Social History Narrative    Not on file     No family history on file

## 2019-03-05 ENCOUNTER — OFFICE VISIT (OUTPATIENT)
Dept: URGENT CARE | Facility: CLINIC | Age: 44
End: 2019-03-05
Payer: COMMERCIAL

## 2019-03-05 VITALS
HEART RATE: 126 BPM | OXYGEN SATURATION: 98 % | HEIGHT: 61 IN | BODY MASS INDEX: 28.7 KG/M2 | SYSTOLIC BLOOD PRESSURE: 125 MMHG | RESPIRATION RATE: 20 BRPM | DIASTOLIC BLOOD PRESSURE: 75 MMHG | TEMPERATURE: 103.5 F | WEIGHT: 152 LBS

## 2019-03-05 DIAGNOSIS — J11.1 FLU: Primary | ICD-10-CM

## 2019-03-05 PROCEDURE — G0382 LEV 3 HOSP TYPE B ED VISIT: HCPCS | Performed by: PREVENTIVE MEDICINE

## 2019-03-05 PROCEDURE — S9083 URGENT CARE CENTER GLOBAL: HCPCS | Performed by: PREVENTIVE MEDICINE

## 2019-03-05 PROCEDURE — 87631 RESP VIRUS 3-5 TARGETS: CPT | Performed by: PREVENTIVE MEDICINE

## 2019-03-05 RX ORDER — CETIRIZINE HYDROCHLORIDE 10 MG/1
10 TABLET ORAL DAILY
COMMUNITY
End: 2019-11-26 | Stop reason: ALTCHOICE

## 2019-03-05 RX ORDER — OSELTAMIVIR PHOSPHATE 75 MG/1
75 CAPSULE ORAL 2 TIMES DAILY
Qty: 10 CAPSULE | Refills: 0 | Status: SHIPPED | OUTPATIENT
Start: 2019-03-05 | End: 2019-03-10

## 2019-03-05 NOTE — LETTER
March 5, 2019     Patient: Joseph Cho   YOB: 1975   Date of Visit: 3/5/2019       To Whom It May Concern: It is my medical opinion that Esteban Martinez may return to work on 3/11  If you have any questions or concerns, please don't hesitate to call  Sincerely,        J[facility] Fuentesable [facility]    CC: Jess Archuleta

## 2019-03-05 NOTE — PROGRESS NOTES
[facility]        NAME: Jesus Garsia is a 44 y o  female  : 1975    MRN: [id number]  DATE: 2019  TIME: 6:29 PM    Assessment and Plan   Flu [J11 1]  1  Flu  Influenza A/B and RSV by PCR    oseltamivir (TAMIFLU) 75 mg capsule         Patient Instructions       Follow up with PCP in 3-5 days  Proceed to  ER if symptoms worsen  Chief Complaint     Chief Complaint   Patient presents with    Influenza     patient reports she is immune compromised, exposed to the flu yesterday, fever of 103 5 achy,chills  History of Present Illness       Began yesterday with high fever and body aches  Note, she is immunocompromised she has just finished chemotherapy for breast cancer  Review of Systems   Review of Systems   Constitutional: Positive for fatigue and fever           Current Medications       Current Outpatient Medications:     budesonide-formoterol (SYMBICORT) 160-4 5 mcg/act inhaler, Inhale 2 puffs 2 (two) times a day, Disp: , Rfl:     cetirizine (ZyrTEC) 10 mg tablet, Take 10 mg by mouth daily, Disp: , Rfl:     ranitidine (ZANTAC) 150 MG capsule, Take 150 mg by mouth, Disp: , Rfl:     Ferrous Bisglycinate Chelate 15 MG TABS, Take 1 tablet by mouth daily, Disp: , Rfl:     ibuprofen (MOTRIN) 200 mg tablet, Take by mouth every 6 (six) hours as needed for mild pain, Disp: , Rfl:     loratadine (CLARITIN) 10 mg tablet, Take 10 mg by mouth daily, Disp: , Rfl:     methylPREDNISolone 4 MG tablet therapy pack, Use as directed on package (Patient not taking: Reported on 3/5/2019), Disp: 21 each, Rfl: 0    oseltamivir (TAMIFLU) 75 mg capsule, Take 1 capsule (75 mg total) by mouth 2 (two) times a day for 5 days, Disp: 10 capsule, Rfl: 0    prochlorperazine (COMPAZINE) 10 mg tablet, Take 10 mg by mouth, Disp: , Rfl:     Current Allergies     Allergies as of 2019 - Reviewed 2019   Allergen Reaction Noted    Eggs or egg-derived products Anaphylaxis 2012    Nuts Anaphylaxis 06/25/2012    Shellfish allergy Anaphylaxis 06/25/2012    Penicillins Vomiting 06/25/2012    Aspirin Rash     Bacitracin Rash 03/06/2014    Neomycin-bacitracin zn-polymyx Rash 03/06/2014            The following portions of the patient's history were reviewed and updated as appropriate: allergies, current medications, past family history, past medical history, past social history, past surgical history and problem list      Past Medical History:   Diagnosis Date    Asthma     Cancer [facility])        Past Surgical History:   Procedure Laterality Date    BREAST BIOPSY Right 05/01/2018    Benign    BREAST BIOPSY Left 05/01/2018    IDC    MAMMO STEREOTACTIC BREAST BIOPSY RIGHT (ALL INC) Right 5/1/2018    SEPTOPLASTY  01/04/2018    US GUIDED BREAST BIOPSY LEFT COMPLETE Left 5/1/2018       No family history on file  Medications have been verified  Objective   /75   Pulse (!) 126   Temp (!) 103 5 °F (39 7 °C)   Resp 20   Ht 5' 1" (1 549 m)   Wt 68 9 kg (152 lb)   SpO2 98%   BMI 28 72 kg/m²        Physical Exam     Physical Exam   HENT:   Left Ear: External ear normal    Mouth/Throat: Oropharynx is clear and moist  No oropharyngeal exudate  Cardiovascular: Normal heart sounds  Pulmonary/Chest: Breath sounds normal    Lymphadenopathy:     She has no cervical adenopathy

## 2019-03-05 NOTE — PATIENT INSTRUCTIONS
Call in 2 days for the results of the influenza screen  If it is negative you may stop the Tamiflu  Motrin for fever, cool water soaks for fever over 103

## 2019-03-06 LAB
FLUAV AG SPEC QL: NOT DETECTED
FLUBV AG SPEC QL: NOT DETECTED
RSV B RNA SPEC QL NAA+PROBE: NOT DETECTED

## 2019-03-07 ENCOUNTER — TELEPHONE (OUTPATIENT)
Dept: URGENT CARE | Facility: CLINIC | Age: 44
End: 2019-03-07

## 2019-05-08 ENCOUNTER — OFFICE VISIT (OUTPATIENT)
Dept: FAMILY MEDICINE CLINIC | Facility: CLINIC | Age: 44
End: 2019-05-08
Payer: COMMERCIAL

## 2019-05-08 VITALS
WEIGHT: 146 LBS | HEART RATE: 66 BPM | HEIGHT: 61 IN | BODY MASS INDEX: 27.56 KG/M2 | DIASTOLIC BLOOD PRESSURE: 74 MMHG | TEMPERATURE: 98.4 F | SYSTOLIC BLOOD PRESSURE: 110 MMHG | RESPIRATION RATE: 12 BRPM

## 2019-05-08 DIAGNOSIS — C50.412 MALIGNANT NEOPLASM OF UPPER-OUTER QUADRANT OF LEFT FEMALE BREAST, UNSPECIFIED ESTROGEN RECEPTOR STATUS (HCC): ICD-10-CM

## 2019-05-08 DIAGNOSIS — Z88.6 DISORDER OF RESPIRATORY SYSTEM EXACERBATED BY ASPIRIN: ICD-10-CM

## 2019-05-08 DIAGNOSIS — J32.9 CHRONIC SINUSITIS, UNSPECIFIED LOCATION: ICD-10-CM

## 2019-05-08 DIAGNOSIS — J45.909 DISORDER OF RESPIRATORY SYSTEM EXACERBATED BY ASPIRIN: ICD-10-CM

## 2019-05-08 DIAGNOSIS — J33.9 DISORDER OF RESPIRATORY SYSTEM EXACERBATED BY ASPIRIN: ICD-10-CM

## 2019-05-08 DIAGNOSIS — J45.40 MODERATE PERSISTENT ASTHMA, UNSPECIFIED WHETHER COMPLICATED: Primary | ICD-10-CM

## 2019-05-08 PROCEDURE — 1036F TOBACCO NON-USER: CPT | Performed by: FAMILY MEDICINE

## 2019-05-08 PROCEDURE — 3008F BODY MASS INDEX DOCD: CPT | Performed by: FAMILY MEDICINE

## 2019-05-08 PROCEDURE — 99214 OFFICE O/P EST MOD 30 MIN: CPT | Performed by: FAMILY MEDICINE

## 2019-05-08 RX ORDER — BUDESONIDE 0.25 MG/2ML
0.25 INHALANT ORAL 2 TIMES DAILY
Qty: 60 AMPULE | Refills: 1 | Status: SHIPPED | OUTPATIENT
Start: 2019-05-08 | End: 2020-01-09 | Stop reason: ALTCHOICE

## 2019-05-08 RX ORDER — ALBUTEROL SULFATE 90 UG/1
2 AEROSOL, METERED RESPIRATORY (INHALATION) EVERY 6 HOURS PRN
Qty: 1 INHALER | Refills: 2 | Status: SHIPPED | OUTPATIENT
Start: 2019-05-08

## 2019-05-08 RX ORDER — PREDNISONE 10 MG/1
TABLET ORAL
Qty: 45 TABLET | Refills: 0 | Status: SHIPPED | OUTPATIENT
Start: 2019-05-08 | End: 2019-08-14 | Stop reason: SDUPTHER

## 2019-05-08 RX ORDER — DOXYCYCLINE HYCLATE 100 MG/1
100 CAPSULE ORAL EVERY 12 HOURS SCHEDULED
Qty: 14 CAPSULE | Refills: 0 | Status: SHIPPED | OUTPATIENT
Start: 2019-05-08 | End: 2019-05-15

## 2019-05-08 RX ORDER — BUDESONIDE AND FORMOTEROL FUMARATE DIHYDRATE 160; 4.5 UG/1; UG/1
2 AEROSOL RESPIRATORY (INHALATION) 2 TIMES DAILY
Qty: 1 INHALER | Refills: 1 | Status: SHIPPED | OUTPATIENT
Start: 2019-05-08

## 2019-08-13 ENCOUNTER — OFFICE VISIT (OUTPATIENT)
Dept: URGENT CARE | Facility: CLINIC | Age: 44
End: 2019-08-13
Payer: COMMERCIAL

## 2019-08-13 VITALS
SYSTOLIC BLOOD PRESSURE: 117 MMHG | OXYGEN SATURATION: 96 % | HEIGHT: 61 IN | RESPIRATION RATE: 20 BRPM | HEART RATE: 69 BPM | TEMPERATURE: 98.2 F | DIASTOLIC BLOOD PRESSURE: 58 MMHG | WEIGHT: 149.4 LBS | BODY MASS INDEX: 28.21 KG/M2

## 2019-08-13 DIAGNOSIS — J01.91 ACUTE RECURRENT SINUSITIS, UNSPECIFIED LOCATION: Primary | ICD-10-CM

## 2019-08-13 PROCEDURE — 99213 OFFICE O/P EST LOW 20 MIN: CPT | Performed by: FAMILY MEDICINE

## 2019-08-13 RX ORDER — MONTELUKAST SODIUM 10 MG/1
10 TABLET ORAL
Qty: 15 TABLET | Refills: 1 | Status: SHIPPED | OUTPATIENT
Start: 2019-08-13 | End: 2019-11-26 | Stop reason: SDUPTHER

## 2019-08-13 RX ORDER — AZITHROMYCIN 250 MG/1
TABLET, FILM COATED ORAL
Qty: 6 TABLET | Refills: 0 | Status: SHIPPED | OUTPATIENT
Start: 2019-08-13 | End: 2019-08-18

## 2019-08-13 NOTE — PATIENT INSTRUCTIONS
Hopefully the Singulair helps you with your symptoms  Fill the prescription for the antibiotic if you are worsening or failing to improve

## 2019-08-13 NOTE — PROGRESS NOTES
Assessment/Plan:      Diagnoses and all orders for this visit:    Acute recurrent sinusitis, unspecified location  -     montelukast (SINGULAIR) 10 mg tablet; Take 1 tablet (10 mg total) by mouth daily at bedtime  -     azithromycin (ZITHROMAX) 250 mg tablet; Take 2 tablets today then 1 tablet daily x 4 days          Subjective:     Patient ID: Bar Byrd is a 44 y o  female  Patient has longstanding sinus issues  She has had polyps and had surgery  She believes the polyps have recurred  Presents with a 2+ week history of increased the pressure and congestion in the facial sinus areas  Denies fever  Review of Systems   Constitutional: Negative  HENT: Positive for postnasal drip and sinus pressure  Eyes: Negative  Respiratory: Negative  Objective:     Physical Exam   Constitutional: She is oriented to person, place, and time  She appears well-developed and well-nourished  HENT:   Head: Normocephalic and atraumatic  There is no tenderness over the facial sinuses  Eyes: EOM are normal    Cardiovascular: Normal rate, regular rhythm and normal heart sounds  Pulmonary/Chest: Effort normal and breath sounds normal    Neurological: She is alert and oriented to person, place, and time

## 2019-08-14 DIAGNOSIS — J32.9 CHRONIC SINUSITIS, UNSPECIFIED LOCATION: ICD-10-CM

## 2019-08-14 RX ORDER — PREDNISONE 10 MG/1
TABLET ORAL
Qty: 45 TABLET | Refills: 0 | Status: SHIPPED | OUTPATIENT
Start: 2019-08-14 | End: 2019-11-26 | Stop reason: ALTCHOICE

## 2019-08-14 NOTE — TELEPHONE ENCOUNTER
The [facility] for a [facility] yesterday  She is still having severe congestion and has history of nasal polyps  She has gotten relief with prednisone in the past   I will send a prednisone taper in for her as ordered  She will follow up if there is no improvement

## 2019-11-26 PROBLEM — Z13.39: Status: ACTIVE | Noted: 2018-06-12

## 2019-11-26 PROBLEM — F17.200 SMOKING: Status: ACTIVE | Noted: 2018-06-12

## 2019-11-26 PROBLEM — Z88.6 TRIAD ASTHMA: Status: ACTIVE | Noted: 2019-11-26

## 2019-11-26 PROBLEM — J45.909 TRIAD ASTHMA: Status: ACTIVE | Noted: 2019-11-26

## 2019-11-26 PROBLEM — Z17.1 CARCINOMA OF UPPER-OUTER QUADRANT OF LEFT BREAST IN FEMALE, ESTROGEN RECEPTOR NEGATIVE (HCC): Status: ACTIVE | Noted: 2018-05-07

## 2019-11-26 PROBLEM — G62.0 CHEMOTHERAPY-INDUCED PERIPHERAL NEUROPATHY (HCC): Status: ACTIVE | Noted: 2018-10-30

## 2019-11-26 PROBLEM — T45.1X5A CHEMOTHERAPY-INDUCED PERIPHERAL NEUROPATHY (HCC): Status: ACTIVE | Noted: 2018-10-30

## 2019-11-26 PROBLEM — J45.909 ASTHMA: Status: ACTIVE | Noted: 2019-11-26

## 2019-11-26 PROBLEM — R79.89 ABNORMAL LFTS (LIVER FUNCTION TESTS): Status: ACTIVE | Noted: 2018-10-09

## 2019-11-26 PROBLEM — D50.9 MICROCYTIC ANEMIA: Status: ACTIVE | Noted: 2018-06-12

## 2019-11-26 PROBLEM — C50.919 TRIPLE NEGATIVE MALIGNANT NEOPLASM OF BREAST (HCC): Status: ACTIVE | Noted: 2018-06-12

## 2019-11-26 PROBLEM — J33.9 TRIAD ASTHMA: Status: ACTIVE | Noted: 2019-11-26

## 2019-11-26 PROBLEM — J82.83 EOSINOPHILIC ASTHMA: Status: ACTIVE | Noted: 2019-11-26

## 2019-11-27 PROBLEM — Z91.018 TREE NUT ALLERGY: Status: ACTIVE | Noted: 2019-11-27

## 2019-11-27 PROBLEM — J30.89 ALLERGIC RHINITIS DUE TO MOLD: Status: ACTIVE | Noted: 2019-11-27

## 2019-11-27 PROBLEM — J30.1 CHRONIC SEASONAL ALLERGIC RHINITIS DUE TO POLLEN: Status: ACTIVE | Noted: 2019-11-27

## 2019-11-27 PROBLEM — J30.81 ALLERGIC RHINITIS DUE TO CATS: Status: ACTIVE | Noted: 2019-11-27

## 2019-11-27 PROBLEM — Z91.013 SHELLFISH ALLERGY: Status: ACTIVE | Noted: 2019-11-27

## 2019-11-27 PROBLEM — L20.84 INTRINSIC ATOPIC DERMATITIS: Status: ACTIVE | Noted: 2019-11-27

## 2019-11-27 PROBLEM — J01.91 ACUTE RECURRENT SINUSITIS: Status: ACTIVE | Noted: 2019-11-27

## 2019-11-27 PROBLEM — Z91.010 PEANUT ALLERGY: Status: ACTIVE | Noted: 2019-11-27

## 2019-11-27 PROBLEM — Z88.8 ASPIRIN ALLERGY: Status: ACTIVE | Noted: 2019-11-27

## 2019-11-27 PROBLEM — H10.13 ALLERGIC CONJUNCTIVITIS OF BOTH EYES: Status: ACTIVE | Noted: 2019-11-27

## 2019-11-27 PROBLEM — Z91.012 EGG ALLERGY: Status: ACTIVE | Noted: 2019-11-27

## 2020-01-09 ENCOUNTER — OFFICE VISIT (OUTPATIENT)
Dept: FAMILY MEDICINE CLINIC | Facility: CLINIC | Age: 45
End: 2020-01-09
Payer: COMMERCIAL

## 2020-01-09 VITALS
DIASTOLIC BLOOD PRESSURE: 68 MMHG | HEART RATE: 103 BPM | SYSTOLIC BLOOD PRESSURE: 124 MMHG | HEIGHT: 61 IN | TEMPERATURE: 97.6 F | WEIGHT: 153.6 LBS | OXYGEN SATURATION: 97 % | BODY MASS INDEX: 29 KG/M2

## 2020-01-09 DIAGNOSIS — J32.4 CHRONIC PANSINUSITIS: ICD-10-CM

## 2020-01-09 DIAGNOSIS — H65.91 RIGHT OTITIS MEDIA WITH EFFUSION: Primary | ICD-10-CM

## 2020-01-09 PROCEDURE — 99213 OFFICE O/P EST LOW 20 MIN: CPT | Performed by: FAMILY MEDICINE

## 2020-01-09 PROCEDURE — 3008F BODY MASS INDEX DOCD: CPT | Performed by: FAMILY MEDICINE

## 2020-01-09 RX ORDER — DUPILUMAB 300 MG/2ML
INJECTION, SOLUTION SUBCUTANEOUS
COMMUNITY
Start: 2019-12-28 | End: 2021-05-31

## 2020-01-09 RX ORDER — DOXYCYCLINE HYCLATE 100 MG/1
100 CAPSULE ORAL EVERY 12 HOURS SCHEDULED
Qty: 14 CAPSULE | Refills: 0 | Status: SHIPPED | OUTPATIENT
Start: 2020-01-09 | End: 2020-01-16

## 2020-01-09 RX ORDER — PREDNISONE 10 MG/1
TABLET ORAL
Qty: 21 TABLET | Refills: 0 | Status: SHIPPED | OUTPATIENT
Start: 2020-01-09

## 2020-01-09 NOTE — LETTER
January 9, 2020     Patient: Elma Trujillo   YOB: 1975   Date of Visit: 1/9/2020       To Whom it May Concern:    Readejan Aviles is under my professional care  She was seen in my office on 1/9/2020  She may return to work on 1/13/2020  If you have any questions or concerns, please don't hesitate to call           Sincerely,          Luis Eduardo Izaguirre DO        CC: No Recipients

## 2020-01-09 NOTE — PROGRESS NOTES
Shantelle Mcintyre Melissa Cough 1975 female MRN: [id number]    Family Medicine Acute Visit    ASSESSMENT/PLAN  Problem List Items Addressed This Visit        Respiratory    Chronic pansinusitis    Relevant Medications    doxycycline hyclate (VIBRAMYCIN) 100 mg capsule    predniSONE 10 mg tablet       Nervous and Auditory    Right otitis media with effusion - Primary    Relevant Medications    doxycycline hyclate (VIBRAMYCIN) 100 mg capsule    predniSONE 10 mg tablet                No future appointments  SUBJECTIVE  CC: Sinus Problem (clogged ears/eache)      HPI:  Vimal Gomez is a 38 y o  female who presents for sick visit,  Started with sore throat, fatigue, now with sinus pain, pressure, ear pain  +fever/chills   She started dupixant with her ENT in December     Review of Systems   Constitutional: Negative for chills, fatigue and fever  HENT: Positive for ear pain and rhinorrhea  Negative for congestion and sinus pressure  Eyes: Negative for photophobia and visual disturbance  Respiratory: Negative for cough and shortness of breath  Cardiovascular: Negative for chest pain, palpitations and leg swelling  Gastrointestinal: Negative for abdominal pain, constipation, diarrhea, nausea and vomiting  Genitourinary: Negative for difficulty urinating and dysuria  Musculoskeletal: Negative for arthralgias and myalgias  Skin: Negative for color change and rash  Neurological: Negative for dizziness, weakness, light-headedness and headaches         Historical Information   The patient history was reviewed as follows:  Past Medical History:   Diagnosis Date    Asthma     Cancer [facility])          Past Surgical History:   Procedure Laterality Date    BREAST BIOPSY Right 05/01/2018    Benign    BREAST BIOPSY Left 05/01/2018    IDC    MAMMO STEREOTACTIC BREAST BIOPSY RIGHT (ALL INC) Right 5/1/2018    SEPTOPLASTY  01/04/2018    US GUIDED BREAST BIOPSY LEFT COMPLETE Left 5/1/2018     Family History   Problem Relation Age of Onset    Diabetes Mother     Heart disease Mother     Stroke Mother     Prostate cancer Father     Diabetes type I Sister     Allergies Sister         latex and beestings    No Known Problems Daughter     No Known Problems Daughter     Allergies Daughter         Penicillin      Social History   Social History     Substance and Sexual Activity   Alcohol Use Yes    Frequency: 2-4 times a month    Drinks per session: 1 or 2    Binge frequency: Never    Comment: 5-6 drinks per week     Social History     Substance and Sexual Activity   Drug Use Never     Social History     Tobacco Use   Smoking Status Current Every Day Smoker    Types: Cigarettes    Last attempt to quit: 2015    Years since quittin 1   Smokeless Tobacco Never Used   Tobacco Comment    19 - 3-4 cigarettes per day       Medications:     Current Outpatient Medications:     albuterol (VENTOLIN HFA) 90 mcg/act inhaler, Inhale 2 puffs every 6 (six) hours as needed for wheezing, Disp: 1 Inhaler, Rfl: 2    budesonide-formoterol (SYMBICORT) 160-4 5 mcg/act inhaler, Inhale 2 puffs 2 (two) times a day, Disp: 1 Inhaler, Rfl: 1    DUPIXENT subcutaneous injection, , Disp: , Rfl:     montelukast (SINGULAIR) 10 mg tablet, Take 1 tablet (10 mg total) by mouth daily at bedtime, Disp: 30 tablet, Rfl: 11    doxycycline hyclate (VIBRAMYCIN) 100 mg capsule, Take 1 capsule (100 mg total) by mouth every 12 (twelve) hours for 7 days, Disp: 14 capsule, Rfl: 0    predniSONE 10 mg tablet, Take 60 mg x 1 day, then 50 mg x1 day, then 40 mg x1 day, then 30 mg x1 day, then 20 mg x 1 day, then  10 mg x1 day, Disp: 21 tablet, Rfl: 0    Allergies   Allergen Reactions    Eggs Or Egg-Derived Products Anaphylaxis    Nuts Anaphylaxis    Shellfish Allergy Anaphylaxis    Nsaids      Diagnosed with AERD    Penicillins Vomiting    Aspirin Rash    Bacitracin Rash    Neomycin-Bacitracin Zn-Polymyx Rash       OBJECTIVE  Vitals:   Vitals: 01/09/20 1136   BP: 124/68   BP Location: Left arm   Patient Position: Sitting   Cuff Size: Large   Pulse: 103   Temp: 97 6 °F (36 4 °C)   TempSrc: Tympanic   SpO2: 97%   Weight: 69 7 kg (153 lb 9 6 oz)   Height: 5' 1" (1 549 m)         Physical Exam   Constitutional: She is oriented to person, place, and time  She appears well-developed and well-nourished  HENT:   Head: Normocephalic and atraumatic  Right Ear: Tympanic membrane is erythematous and bulging  Nose: Mucosal edema and rhinorrhea present  Right sinus exhibits maxillary sinus tenderness  Mouth/Throat: Oropharynx is clear and moist    Eyes: Pupils are equal, round, and reactive to light  Neck: Normal range of motion  Neck supple  Cardiovascular: Normal rate, regular rhythm and normal heart sounds  Pulmonary/Chest: Effort normal and breath sounds normal  No respiratory distress  She has no wheezes  Abdominal: Soft  Bowel sounds are normal  She exhibits no distension  There is no tenderness  Musculoskeletal: Normal range of motion  She exhibits no edema or tenderness  Neurological: She is alert and oriented to person, place, and time  Skin: Skin is warm and dry  Psychiatric: She has a normal mood and affect   Her behavior is normal                   [place], DO    1/9/2020

## 2020-01-10 ENCOUNTER — VBI (OUTPATIENT)
Dept: FAMILY MEDICINE CLINIC | Facility: CLINIC | Age: 45
End: 2020-01-10

## 2020-01-10 NOTE — LETTER
Procedure Request Form: Cervical Cancer Screening      Date Requested: 01/10/20  Patient: Zoran Pastor  Patient : 1975   Referring Provider: Kenyatta Chávez, DO        Date of Procedure ______________________________       The above patient has informed us that they have completed their   most recent Cervical Cancer Screening at your facility  Please complete   this form and attach all corresponding procedure reports/results  Comments __________________________________________________________  ____________________________________________________________________  ____________________________________________________________________  ____________________________________________________________________    Facility Completing Procedure _________________________________________    Form Completed By (print name) _______________________________________      Signature __________________________________________________________      These reports are needed for  compliance    Please fax this completed form and a copy of the procedure report to our office as soon as possible to [phone] jam Milligan: Phone [phone]    We thank you for your assistance in treating our mutual patient     (sent to [facility])

## 2020-01-10 NOTE — TELEPHONE ENCOUNTER
Corinne Spencer, [address] [facility]             Patient had a GYN exam at [facility] in [place] on 01/02/20      Note states 'No pap done'  Faxed to [facility] [phone] Yodit May 01/10/20 7:55 AM     Received Record Request Response from [facility] [place]  stating ' No pap was done this year, last pap was in 2018 ' but no cervical cancer cytology was included Chel Campos MA 01/10/20 11:43 AM            01/10/2020 11:40 AM Phone Madalyn Shipman at [address] (Provider) [phone] Remove   Call Complete - States she will fax cervical cancer cytology from 12/17/2018        By Rahul mendez MA               Received Pap Smear (HPV) aka Cervical Cancer Screening DOS 12/17/18, resulted Chel Campos MA 01/13/20 12:41 PM

## 2020-12-31 ENCOUNTER — VBI (OUTPATIENT)
Dept: FAMILY MEDICINE CLINIC | Facility: CLINIC | Age: 45
End: 2020-12-31

## 2021-07-13 ENCOUNTER — OFFICE VISIT (OUTPATIENT)
Dept: URGENT CARE | Facility: CLINIC | Age: 46
End: 2021-07-13
Payer: COMMERCIAL

## 2021-07-13 VITALS
RESPIRATION RATE: 18 BRPM | BODY MASS INDEX: 29.44 KG/M2 | HEIGHT: 62 IN | OXYGEN SATURATION: 98 % | SYSTOLIC BLOOD PRESSURE: 118 MMHG | DIASTOLIC BLOOD PRESSURE: 80 MMHG | WEIGHT: 160 LBS | HEART RATE: 78 BPM | TEMPERATURE: 98.7 F

## 2021-07-13 DIAGNOSIS — T14.8XXA BLISTER OF SKIN: Primary | ICD-10-CM

## 2021-07-13 DIAGNOSIS — T30.0 SKIN BURN: ICD-10-CM

## 2021-07-13 PROCEDURE — G0382 LEV 3 HOSP TYPE B ED VISIT: HCPCS | Performed by: PHYSICIAN ASSISTANT

## 2021-07-13 PROCEDURE — S9083 URGENT CARE CENTER GLOBAL: HCPCS | Performed by: PHYSICIAN ASSISTANT

## 2021-07-13 NOTE — PATIENT INSTRUCTIONS
Second-Degree Burn   AMBULATORY CARE:   A second-degree burn  is also called a partial-thickness burn  A second-degree burn occurs when the first layer and some of the second layer of skin are burned  A superficial second-degree burn usually heals within 2 to 3 weeks with some scarring  A deep second-degree burn can take longer to heal  A second-degree burn can also get worse after a few days and become a third-degree burn  Common signs and symptoms of a second-degree burn:   · A superficial second-degree burn  includes the first layer and some of the second layer  The deeper layers, sweat glands, and oil glands are not damaged  The skin is red, moist, very painful to the touch, and has blisters  Areas of redness turn white when pressure is applied  The area returns to red quickly when the pressure is removed  · A deep second-degree burn  includes damage in the middle layer, and in the sweat glands and oil glands  The skin is mixed red or waxy white, and wet or moist  Some areas of redness may turn white when pressure is applied  The area may return to red slowly or not all when the pressure is removed  Treatment  may include any of the following:  · Medicines  may be used to decrease pain, prevent infection, or help your burn heal  They may be given as a pill or as an ointment applied to your skin  · Surgery  may remove damaged tissue, replace or cover lost skin, or relieve pressure and improve blood flow  Surgery can help prevent infection, decrease inflammation, and improve healing  Surgery can also improve the appearance of your skin and reduce scarring  Burn care:   · Wash your hands with soap and water  Dry your hands with a clean towel or paper towel  · Remove old bandages  You may need to soak the bandage in water before you remove it so it will not stick to your wound  · Gently clean the burned area daily with mild soap and water  Pat the area dry   Look for any swelling or redness around the burn  Do not break closed blisters  You may cause a skin infection  · Apply cream or ointment to the burn with a cotton swab  Place a nonstick bandage over your burn  · Wrap a layer of gauze around the bandage to hold it in place  The wrap should be snug but not tight  It is too tight if you feel tingling or lose feeling in that area  · Apply gentle pressure for a few minutes if bleeding occurs  · Elevate your burned arm or leg above the level of your heart as often as you can  This will help decrease swelling and pain  Prop your burned arm or leg on pillows or blankets to keep it elevated comfortably  Self-care:   · Drink liquids as directed  You may need to drink extra liquid to help prevent dehydration  Ask how much liquid to drink each day and which liquids are best for you  · Go to physical therapy, if directed  Your muscles and joints may not work well after a second-degree burn  A physical therapist teaches you exercises to help improve movement and strength, and to decrease pain  Prevent second-degree burns:   · Do not leave cups, mugs, or bowls containing hot liquids at the edge of a table  Keep pot handles turned away from the stove front  · Do not leave a lit cigarette  Make sure it is no longer lit  Then dispose of it safely  · Store dangerous items out of the reach of children  Store cigarette lighters, matches, and chemicals where children cannot reach them  Use child safety latches on the door of the safe storage area  · Keep your water heater setting to low or medium  (90°F to 120°F, or 32°C to 48°C)  · Wear sunscreen that has a sun protectant factor (SPF) of 15 or higher  The sunscreen should also have ultraviolet A (UVA) and ultraviolet B (UVB) protection  Follow the directions on the label when you use sunscreen  Put on more sunscreen if you are in the sun for more than an hour   Reapply sunscreen often if you go swimming or are sweating  Follow up with your doctor or burn specialist as directed: You may need to return to have your wound checked and your bandage changed  Write down your questions so you remember to ask them during your visits  © Copyright [address] Information is for End User's use only and may not be sold, redistributed or otherwise used for commercial purposes  All illustrations and images included in CareNotes® are the copyrighted property of A D A M , Inc  or [facility] Herbie Robison   The above information is an  only  It is not intended as medical advice for individual conditions or treatments  Talk to your doctor, nurse or pharmacist before following any medical regimen to see if it is safe and effective for you

## 2021-08-10 NOTE — PROGRESS NOTES
[facility]        NAME: Jean Bass is a 41 y o  female  : 1975    MRN: [id number]  DATE: August 10, 2021  TIME: 7:01 AM    Assessment and Plan   Blister of skin [T14  8XXA]  1  Blister of skin  silver sulfadiazine (SILVADENE,SSD) 1 % cream    silver sulfadiazine (SILVADENE,SSD) 1 % cream   2  Skin burn  silver sulfadiazine (SILVADENE,SSD) 1 % cream    silver sulfadiazine (SILVADENE,SSD) 1 % cream         Patient Instructions       Follow up with PCP in 3-5 days  Proceed to  ER if symptoms worsen  Chief Complaint     Chief Complaint   Patient presents with    Blister     obtained burn to right inner elbow last night with frying oil         History of Present Illness        70-year-old female presents to the clinic with a burn to the right inner elbow that occurred last night  Patient states that she was frying food when she went to move the frying pan with hot oil some of the oil splattered up hitting her in her elbow  Patient states that she ran cold water under the area immediately after the injury occurred and noticed that the skin started getting red and later that evening blistered  Review of Systems   Review of Systems   Constitutional: Negative for chills, fatigue and fever  Skin: Positive for color change, rash and wound  Neurological: Negative for dizziness, weakness, light-headedness, numbness and headaches           Current Medications       Current Outpatient Medications:     Dupixent subcutaneous injection, INJECT 300MG SUBCUTANEOUSLY EVERY OTHER WEEK, Disp: 4 mL, Rfl: 6    albuterol (VENTOLIN HFA) 90 mcg/act inhaler, Inhale 2 puffs every 6 (six) hours as needed for wheezing (Patient not taking: Reported on 2021), Disp: 1 Inhaler, Rfl: 2    budesonide-formoterol (SYMBICORT) 160-4 5 mcg/act inhaler, Inhale 2 puffs 2 (two) times a day (Patient not taking: Reported on 2021), Disp: 1 Inhaler, Rfl: 1    montelukast (SINGULAIR) 10 mg tablet, Take 1 tablet (10 mg total) by mouth daily at bedtime (Patient not taking: Reported on 7/13/2021), Disp: 30 tablet, Rfl: 11    predniSONE 10 mg tablet, Take 60 mg x 1 day, then 50 mg x1 day, then 40 mg x1 day, then 30 mg x1 day, then 20 mg x 1 day, then  10 mg x1 day (Patient not taking: Reported on 7/13/2021), Disp: 21 tablet, Rfl: 0    silver sulfadiazine (SILVADENE,SSD) 1 % cream, Apply topically daily, Disp: 50 g, Rfl: 0    Current Facility-Administered Medications:     silver sulfadiazine (SILVADENE,SSD) 1 % cream, , Topical, Once, Rayne Purpura, PA-C    Current Allergies     Allergies as of 07/13/2021 - Reviewed 07/13/2021   Allergen Reaction Noted    Eggs or egg-derived products - food allergy Anaphylaxis 06/25/2012    Nuts - food allergy Anaphylaxis 06/25/2012    Shellfish allergy - food allergy Anaphylaxis 06/25/2012    Nsaids  07/23/2019    Penicillins Vomiting 06/25/2012    Aspirin Rash     Bacitracin Rash 03/06/2014    Neomycin-bacitracin zn-polymyx Rash 03/06/2014            The following portions of the patient's history were reviewed and updated as appropriate: allergies, current medications, past family history, past medical history, past social history, past surgical history and problem list      Past Medical History:   Diagnosis Date    Asthma     Cancer ([facility] [address] )     breast cancer       Past Surgical History:   Procedure Laterality Date    BREAST BIOPSY Right 05/01/2018    Benign    BREAST BIOPSY Left 05/01/2018    IDC    MAMMO STEREOTACTIC BREAST BIOPSY RIGHT (ALL INC) Right 5/1/2018    SEPTOPLASTY  01/04/2018    US GUIDED BREAST BIOPSY LEFT COMPLETE Left 5/1/2018       Family History   Problem Relation Age of Onset    Diabetes Mother     Heart disease Mother     Stroke Mother     Prostate cancer Father     Diabetes type I Sister     Allergies Sister         latex and beestings    No Known Problems Daughter     No Known Problems Daughter     Allergies Daughter         Penicillin Medications have been verified  Objective   /80   Pulse 78   Temp 98 7 °F (37 1 °C) (Tympanic)   Resp 18   Ht 5' 1 5" (1 562 m)   Wt 72 6 kg (160 lb)   SpO2 98%   BMI 29 74 kg/m²   No LMP recorded  (Menstrual status: Chemotherapy/radiation)  Physical Exam     Physical Exam  Vitals and nursing note reviewed  Constitutional:       General: She is not in acute distress  Appearance: She is well-developed  She is not diaphoretic  HENT:      Head: Normocephalic and atraumatic  Pulmonary:      Effort: Pulmonary effort is normal    Musculoskeletal:         General: Normal range of motion  Skin:     General: Skin is warm and dry  Capillary Refill: Capillary refill takes less than 2 seconds  Findings: Burn and erythema present  Comments: Erythematous area noted to right medial elbow with blister to area  Area is TTP  Neurological:      Mental Status: She is alert and oriented to person, place, and time

## 2023-02-23 ENCOUNTER — TELEPHONE (OUTPATIENT)
Dept: FAMILY MEDICINE CLINIC | Facility: CLINIC | Age: 48
End: 2023-02-23

## 2023-02-23 NOTE — TELEPHONE ENCOUNTER
Pt LVM requesting epi-pen to be sent to pharm  We have not seen pt in over three years  Epi-pen not on medication list     Placed call to pt to let her know we haven't seen her and she will need to make an appointment to be seen for any medication to be sent in  Pt repots she will call her allergist instead

## 2024-02-08 ENCOUNTER — APPOINTMENT (OUTPATIENT)
Dept: RADIOLOGY | Facility: CLINIC | Age: 49
End: 2024-02-08
Payer: COMMERCIAL

## 2024-02-08 ENCOUNTER — OFFICE VISIT (OUTPATIENT)
Dept: URGENT CARE | Facility: CLINIC | Age: 49
End: 2024-02-08
Payer: COMMERCIAL

## 2024-02-08 VITALS
DIASTOLIC BLOOD PRESSURE: 84 MMHG | HEART RATE: 82 BPM | OXYGEN SATURATION: 99 % | TEMPERATURE: 99.7 F | SYSTOLIC BLOOD PRESSURE: 148 MMHG | RESPIRATION RATE: 18 BRPM

## 2024-02-08 DIAGNOSIS — M79.672 LEFT FOOT PAIN: ICD-10-CM

## 2024-02-08 DIAGNOSIS — S92.415A NONDISPLACED FRACTURE OF PROXIMAL PHALANX OF LEFT GREAT TOE, INITIAL ENCOUNTER FOR CLOSED FRACTURE: Primary | ICD-10-CM

## 2024-02-08 PROCEDURE — 73630 X-RAY EXAM OF FOOT: CPT

## 2024-02-08 PROCEDURE — 99213 OFFICE O/P EST LOW 20 MIN: CPT | Performed by: PHYSICIAN ASSISTANT

## 2024-02-08 NOTE — PROGRESS NOTES
[facility]        NAME: Jess Purcell is a 48 y.o. female  : 1975    MRN: [id number]  DATE: 2024  TIME: 6:34 PM    Assessment and Plan   Nondisplaced fracture of proximal phalanx of left great toe, initial encounter for closed fracture [S92.415A]  1. Nondisplaced fracture of proximal phalanx of left great toe, initial encounter for closed fracture  Ambulatory referral to Orthopedic Surgery      2. Left foot pain  XR foot 3+ vw left            Patient Instructions       Follow up with PCP in 3-5 days.  Proceed to  ER if symptoms worsen.    Chief Complaint     Chief Complaint   Patient presents with    Foot Pain     Patient has left foot pain she suffered on Tuesday night when her toe bent backwards as she was sliding on a carpet she had walked on          History of Present Illness       Patient was at the bottom of her steps when her foot slid out patient causing her toes to bend forward all the way until her knee hit the ground.  Developed pain, bruising, and swelling the great toe and other toes immediately after.  Has had to walk on the outside of her foot since then.  Denies numbness/tingling.        Review of Systems   Review of Systems   Musculoskeletal:  Positive for arthralgias, gait problem and joint swelling.   Skin:  Positive for color change.   Neurological:  Negative for weakness and numbness.         Current Medications       Current Outpatient Medications:     albuterol (VENTOLIN HFA) 90 mcg/act inhaler, Inhale 2 puffs every 6 (six) hours as needed for wheezing (Patient not taking: Reported on 2021), Disp: 1 Inhaler, Rfl: 2    brimonidine-timolol (COMBIGAN) 0.2-0.5 %, INSTILL 1 DROP INTO BOTH EYES TWO TIMES A DAY, Disp: , Rfl:     budesonide-formoterol (SYMBICORT) 160-4.5 mcg/act inhaler, Inhale 2 puffs 2 (two) times a day (Patient not taking: Reported on 2021), Disp: 1 Inhaler, Rfl: 1    Dupixent subcutaneous injection, INJECT 300MG SUBCUTANEOUSLY EVERY OTHER  WEEK, Disp: 4 mL, Rfl: 6    EPINEPHrine (Auvi-Q) 0.3 mg/0.3 mL SOAJ, Inject 0.3 mL (0.3 mg total) into a muscle once for 1 dose, Disp: 0.6 mL, Rfl: 0    latanoprost (XALATAN) 0.005 % ophthalmic solution, 1 drop daily at bedtime, Disp: , Rfl:     montelukast (SINGULAIR) 10 mg tablet, Take 1 tablet (10 mg total) by mouth daily at bedtime (Patient not taking: Reported on 7/13/2021), Disp: 30 tablet, Rfl: 11    predniSONE 10 mg tablet, Take 60 mg x 1 day, then 50 mg x1 day, then 40 mg x1 day, then 30 mg x1 day, then 20 mg x 1 day, then  10 mg x1 day (Patient not taking: Reported on 7/13/2021), Disp: 21 tablet, Rfl: 0    silver sulfadiazine (SILVADENE,SSD) 1 % cream, Apply topically daily, Disp: 50 g, Rfl: 0    Current Allergies     Allergies as of 02/08/2024 - Reviewed 07/13/2021   Allergen Reaction Noted    Eggs or egg-derived products - food allergy Anaphylaxis 06/25/2012    Nuts - food allergy Anaphylaxis 06/25/2012    Shellfish allergy - food allergy Anaphylaxis 06/25/2012    Nsaids  07/23/2019    Penicillins Vomiting 06/25/2012    Aspirin Rash     Bacitracin Rash 03/06/2014    Neomycin-bacitracin zn-polymyx Rash 03/06/2014            The following portions of the patient's history were reviewed and updated as appropriate: allergies, current medications, past family history, past medical history, past social history, past surgical history and problem list.     Past Medical History:   Diagnosis Date    Asthma     Cancer (HCC)     breast cancer       Past Surgical History:   Procedure Laterality Date    BREAST BIOPSY Right 05/01/2018    Benign    BREAST BIOPSY Left 05/01/2018    IDC    MAMMO STEREOTACTIC BREAST BIOPSY RIGHT (ALL INC) Right 5/1/2018    SEPTOPLASTY  01/04/2018    US GUIDED BREAST BIOPSY LEFT COMPLETE Left 5/1/2018       Family History   Problem Relation Age of Onset    Diabetes Mother     Heart disease Mother     Stroke Mother     Prostate cancer Father     Diabetes type I Sister     Allergies Sister          latex and beestings    No Known Problems Daughter     No Known Problems Daughter     Allergies Daughter         Penicillin         Medications have been verified.        Objective   /84   Pulse 82   Temp 99.7 °F (37.6 °C)   Resp 18   SpO2 99%   No LMP recorded. (Menstrual status: Chemotherapy/radiation).       Physical Exam     Physical Exam  Constitutional:       Appearance: Normal appearance.   Cardiovascular:      Pulses: Normal pulses.   Musculoskeletal:         General: Swelling and tenderness present. Normal range of motion.      Comments: Tenderness to palpation over the great toe and mildly around the base of the toes.  Ecchymosis around the same areas and also swelling.  Neurovascularly intact distally.  Full active range of motion 5 out of 5 muscle strength of the foot/ankle/toes.   Skin:     Capillary Refill: Capillary refill takes less than 2 seconds.      Findings: Bruising present.   Neurological:      Mental Status: She is alert.   Psychiatric:         Mood and Affect: Mood normal.         Behavior: Behavior normal.

## 2024-02-08 NOTE — PATIENT INSTRUCTIONS
Any new or worsening symptoms develop get re-evaluated sooner or proceed to the ER.  Wear postop shoe and use crutches to assist with ambulating.  Follow-up with orthopedics in the next few days

## 2024-02-12 ENCOUNTER — TELEPHONE (OUTPATIENT)
Dept: OBGYN CLINIC | Facility: HOSPITAL | Age: 49
End: 2024-02-12

## 2024-02-12 NOTE — TELEPHONE ENCOUNTER
Caller: Patient    Doctor: Joycelyn    Reason for call: Patient with Acute oblique nondisplaced fracture of the first proximal phalanx.   Can we fit the patient in the doctors schedule??  Patient wants to wait until Thursday.    Call back#: [phone]

## 2024-02-21 PROBLEM — H10.13 ALLERGIC CONJUNCTIVITIS OF BOTH EYES: Status: RESOLVED | Noted: 2019-11-27 | Resolved: 2024-02-21

## 2024-02-21 PROBLEM — J01.91 ACUTE RECURRENT SINUSITIS: Status: RESOLVED | Noted: 2019-11-27 | Resolved: 2024-02-21

## 2024-02-21 PROBLEM — Z88.6 TRIAD ASTHMA: Status: RESOLVED | Noted: 2019-11-26 | Resolved: 2024-02-21

## 2024-02-21 PROBLEM — Z13.39: Status: RESOLVED | Noted: 2018-06-12 | Resolved: 2024-02-21

## 2024-02-21 PROBLEM — J33.9 TRIAD ASTHMA: Status: RESOLVED | Noted: 2019-11-26 | Resolved: 2024-02-21

## 2024-02-21 PROBLEM — J01.40 ACUTE PANSINUSITIS, UNSPECIFIED: Status: RESOLVED | Noted: 2017-05-11 | Resolved: 2024-02-21

## 2024-02-21 PROBLEM — J45.909 TRIAD ASTHMA: Status: RESOLVED | Noted: 2019-11-26 | Resolved: 2024-02-21

## 2024-02-28 NOTE — PROGRESS NOTES
"Patient ID: Jess Purcell is a 48 y.o. female Date of Birth 1975       Chief Complaint   Patient presents with    Foot Pain     Left fracture great toe             Diagnosis:  1. Nondisplaced fracture of proximal phalanx of left great toe, initial encounter for closed fracture  -     Ambulatory referral to Orthopedic Surgery  -     XR foot 3+ vw left; Future; Expected date: 03/14/2024      Initial pedal examination with socks and shoes removed bilaterally.  I personally reviewed patient's urgent care visit note and left foot x-rays from 2/8/2024, I agree with radiologist interpretation of acute nondisplaced oblique fracture of proximal phalanx of left great toe not involving any intra-articular surfaces, fractures in good alignment.  As patient continues with swelling of her toe, minimal discomfort I have transitioned her to a cam boot for increased ability, I have advised her to use an Ace wrap which was placed today.  She is to keep the cam boot and Ace wrap in place during all waking hours, she may remove to sleep, shower or when at rest.  X-ray was ordered for her to have taken in 2 weeks prior to appointment before she comes up, we will compare with prior x-ray and hopefully transition her to sneaker at that time.  Patient understands and agrees with the plan.      Orthopedic injury treatment    Date/Time: 2/29/2024 7:30 AM    Performed by: Joana Hirsch DPM  Authorized by: Joana Hirsch DPM    Patient Location:  [facility]  [place] Protocol:  Consent: Verbal consent obtained.  Risks and benefits: risks, benefits and alternatives were discussed  Consent given by: patient  Time out: Immediately prior to procedure a \"time out\" was called to verify the correct patient, procedure, equipment, support staff and site/side marked as required.  Patient understanding: patient states understanding of the procedure being performed  Patient identity confirmed: verbally with patient    Injury location:  Toe  Location " details:  Left great toe  Injury type:  Fracture  Neurovascular status: Neurovascularly intact    Manipulation performed?: No    Immobilization: cam boot.  Splint type:  Short leg  Neurovascular status: Neurovascularly intact         Subjective:   Jess presents today upon referral from urgent care for left great toe fracture. Patient was at the bottom of her steps on 24 when her foot slid out patient causing her toes to bend forward all the way until her knee hit the ground.  Developed pain, bruising, and swelling the great toe and other toes immediately after.  Had to walk on the outside of her foot since then.  Denies numbness/tingling.  She was seen at urgent care on 2024, current treatment is surgical shoe.        The following portions of the patient's history were reviewed and updated as appropriate:     Past Medical History:   Diagnosis Date    Asthma     Cancer (HCC)     breast cancer       Past Surgical History:   Procedure Laterality Date    BREAST BIOPSY Right 2018    Benign    BREAST BIOPSY Left 2018    IDC    MAMMO STEREOTACTIC BREAST BIOPSY RIGHT (ALL INC) Right 2018    SEPTOPLASTY  2018    US GUIDED BREAST BIOPSY LEFT COMPLETE Left 2018       Social History     Socioeconomic History    Marital status: /Civil Union     Spouse name: Dani    Number of children: 3    Years of education: Not on file    Highest education level: Not on file   Occupational History    Occupation:    Tobacco Use    Smoking status: Former     Current packs/day: 0.00     Types: Cigarettes     Quit date: 2021     Years since quittin.8    Smokeless tobacco: Never    Tobacco comments:     20 - 3-4 cigarettes per day   Vaping Use    Vaping status: Never Used   Substance and Sexual Activity    Alcohol use: Yes     Comment: 5-6 drinks per week    Drug use: Never    Sexual activity: Yes     Partners: Male     Birth control/protection: None   Other Topics Concern    Not on  file   Social History Narrative    Daily coffee consumption        House is 19 years old    Heating system forced hot air/gas    AC -central    Bedroom is carpeted    No Humidifier or Air Purifier     Basement is dry, has dehumidifier,  Unsure if musty smell, No flooding since 2nd sump pump installed,No visible mold.    Lives with  and 3 daughters            Pets - 2 dogs            Caffeine - Coffee -  2 maximum  Cups per day        Soda rarely      Chocolate - occasionally       Social Determinants of Health     Financial Resource Strain: Low Risk  (1/9/2020)    Overall Financial Resource Strain (CARDIA)     Difficulty of Paying Living Expenses: Not hard at all   Food Insecurity: No Food Insecurity (1/9/2020)    Hunger Vital Sign     Worried About Running Out of Food in the Last Year: Never true     Ran Out of Food in the Last Year: Never true   Transportation Needs: No Transportation Needs (1/9/2020)    PRAPARE - Transportation     Lack of Transportation (Medical): No     Lack of Transportation (Non-Medical): No   Physical Activity: Inactive (11/26/2019)    Exercise Vital Sign     Days of Exercise per Week: 0 days     Minutes of Exercise per Session: 0 min   Stress: No Stress Concern Present (1/9/2020)    [employer] - Occupational Stress Questionnaire     Feeling of Stress : Not at all   Social Connections: Unknown (1/9/2020)    Social Connection and Isolation Panel [NHANES]     Frequency of Communication with Friends and Family: Not on file     Frequency of Social Gatherings with Friends and Family: Twice a week     Attends [employer] Services: Never     Active Member of Clubs or Organizations: No     Attends Club or Organization Meetings: Never     Marital Status:    Intimate Partner Violence: Not At Risk (1/9/2020)    Humiliation, Afraid, Rape, and Kick questionnaire     Fear of Current or Ex-Partner: No     Emotionally Abused: No     Physically Abused: No      "Sexually Abused: No   Housing Stability: Not on file          Current Outpatient Medications:     brimonidine-timolol (COMBIGAN) 0.2-0.5 %, INSTILL 1 DROP INTO BOTH EYES TWO TIMES A DAY, Disp: , Rfl:     Dupixent subcutaneous injection, INJECT 300MG SUBCUTANEOUSLY EVERY OTHER WEEK, Disp: 4 mL, Rfl: 6    latanoprost (XALATAN) 0.005 % ophthalmic solution, 1 drop daily at bedtime, Disp: , Rfl:     albuterol (VENTOLIN HFA) 90 mcg/act inhaler, Inhale 2 puffs every 6 (six) hours as needed for wheezing (Patient not taking: Reported on 7/13/2021), Disp: 1 Inhaler, Rfl: 2    budesonide-formoterol (SYMBICORT) 160-4.5 mcg/act inhaler, Inhale 2 puffs 2 (two) times a day (Patient not taking: Reported on 7/13/2021), Disp: 1 Inhaler, Rfl: 1    EPINEPHrine (Auvi-Q) 0.3 mg/0.3 mL SOAJ, Inject 0.3 mL (0.3 mg total) into a muscle once for 1 dose, Disp: 0.6 mL, Rfl: 0    montelukast (SINGULAIR) 10 mg tablet, Take 1 tablet (10 mg total) by mouth daily at bedtime (Patient not taking: Reported on 7/13/2021), Disp: 30 tablet, Rfl: 11    predniSONE 10 mg tablet, Take 60 mg x 1 day, then 50 mg x1 day, then 40 mg x1 day, then 30 mg x1 day, then 20 mg x 1 day, then  10 mg x1 day (Patient not taking: Reported on 7/13/2021), Disp: 21 tablet, Rfl: 0    silver sulfadiazine (SILVADENE,SSD) 1 % cream, Apply topically daily, Disp: 50 g, Rfl: 0    Allergies  Eggs or egg-derived products - food allergy, Nuts - food allergy, Shellfish allergy - food allergy, Nsaids, Penicillins, Aspirin, Bacitracin, and Neomycin-bacitracin zn-polymyx    Family History   Problem Relation Age of Onset    Diabetes Mother     Heart disease Mother     Stroke Mother     Prostate cancer Father     Diabetes type I Sister     Allergies Sister         latex and beestings    No Known Problems Daughter     No Known Problems Daughter     Allergies Daughter         Penicillin           Objective:  Ht 5' 1\" (1.549 m) Comment: verbal  Wt 80.1 kg (176 lb 9.6 oz)   BMI 33.37 kg/m² "     Review of Systems   Constitutional:  Negative for chills and fever.   HENT:  Negative for ear pain and sore throat.    Eyes:  Negative for pain and visual disturbance.   Respiratory:  Negative for cough and shortness of breath.    Cardiovascular:  Negative for chest pain and palpitations.   Gastrointestinal:  Negative for abdominal pain and vomiting.   Genitourinary:  Negative for dysuria and hematuria.   Musculoskeletal:  Negative for arthralgias and back pain.        Left great toe pain/fracture   Skin:  Negative for color change and rash.   Neurological:  Negative for seizures and syncope.   All other systems reviewed and are negative.      Physical Exam  Constitutional:       Appearance: Normal appearance. She is well-developed and normal weight.   HENT:      Head: Normocephalic and atraumatic.      Nose: Nose normal.      Mouth/Throat:      Mouth: Mucous membranes are moist.      Pharynx: Oropharynx is clear.   Eyes:      Conjunctiva/sclera: Conjunctivae normal.      Pupils: Pupils are equal, round, and reactive to light.   Cardiovascular:      Pulses:           Dorsalis pedis pulses are 2+ on the right side and 2+ on the left side.        Posterior tibial pulses are 2+ on the right side and 2+ on the left side.   Pulmonary:      Effort: Pulmonary effort is normal.   Musculoskeletal:      Cervical back: Normal range of motion.      Right lower leg: No edema.      Left lower leg: No edema.   Feet:      Right foot:      Protective Sensation: 10 sites tested.  10 sites sensed.      Skin integrity: Skin integrity normal.      Toenail Condition: Right toenails are normal.      Left foot:      Protective Sensation: 10 sites tested.  10 sites sensed.      Skin integrity: Skin integrity normal.      Toenail Condition: Left toenails are normal.      Comments: No Tenderness to palpation over the great toe and mildly around the base of the toes.  No ecchymosis around the same areas and also swelling.  Neurovascularly  "intact distally.  Full active range of motion 5 out of 5 muscle strength of the foot/ankle/toes.   Skin:     General: Skin is warm and dry.      Capillary Refill: Capillary refill takes less than 2 seconds.   Neurological:      General: No focal deficit present.      Mental Status: She is alert and oriented to person, place, and time. Mental status is at baseline.   Psychiatric:         Mood and Affect: Mood normal.         Behavior: Behavior normal.         Thought Content: Thought content normal.         Judgment: Judgment normal.              XR foot 3+ vw left    Result Date: 2/9/2024  Narrative: LEFT FOOT INDICATION:   M79.672: Pain in left foot. COMPARISON:  None VIEWS:  XR FOOT 3+ VW LEFT FINDINGS: Acute oblique nondisplaced fracture of the first proximal phalanx. No intra-articular extension. Mild hallux valgus. No lytic or blastic osseous lesion. Soft tissues are unremarkable.     Impression: Acute oblique nondisplaced fracture of the first proximal phalanx. Workstation performed: [id number]             Joana Hirsch DPM, ELYSSA, FACFAS    Portions of the record may have been created with voice recognition software. Occasional wrong word or \"sound a like\" substitutions may have occurred due to the inherent limitations of voice recognition software. Read the chart carefully and recognize, using context, where substitutions have occurred.  "

## 2024-02-29 ENCOUNTER — OFFICE VISIT (OUTPATIENT)
Dept: PODIATRY | Facility: CLINIC | Age: 49
End: 2024-02-29
Payer: COMMERCIAL

## 2024-02-29 VITALS
BODY MASS INDEX: 33.34 KG/M2 | WEIGHT: 176.6 LBS | HEIGHT: 61 IN | SYSTOLIC BLOOD PRESSURE: 109 MMHG | HEART RATE: 71 BPM | DIASTOLIC BLOOD PRESSURE: 68 MMHG

## 2024-02-29 DIAGNOSIS — S92.415A NONDISPLACED FRACTURE OF PROXIMAL PHALANX OF LEFT GREAT TOE, INITIAL ENCOUNTER FOR CLOSED FRACTURE: Primary | ICD-10-CM

## 2024-02-29 PROCEDURE — 99243 OFF/OP CNSLTJ NEW/EST LOW 30: CPT | Performed by: PODIATRIST

## 2024-02-29 PROCEDURE — 28490 TREAT BIG TOE FRACTURE: CPT | Performed by: PODIATRIST

## 2024-02-29 RX ORDER — TIMOLOL MALEATE 5 MG/ML
SOLUTION/ DROPS OPHTHALMIC
COMMUNITY
Start: 2023-12-11

## 2024-03-12 NOTE — PROGRESS NOTES
"Patient ID: Jess Purcell is a 48 y.o. female Date of Birth 1975       Chief Complaint   Patient presents with    Toe Injury     LEFT GREAT TOE FRACTURE               Diagnosis:  1. Nondisplaced fracture of proximal phalanx of left great toe, subsequent encounter for fracture with routine healing      Pedal examination with socks and shoes removed bilaterally.  I personally viewed patient's x-ray from this morning compared with x-ray from 2/8/2024 showing interval signs of healing, good alignment, no displacement of fracture of left great toe, no other acute osseous abnormality, final radiology interpretation is pending.  At this time patient may transition from cam boot to sneaker, shoe or sneaker for 1 hour today, 2 hours tomorrow, gradually increase time in sneaker as she increases time in sneakers she may increase her activities as tolerated.  Patient understands and agrees with plan will follow-up as needed.        Subjective:   He presents today for follow-up of left great toe fracture which occurred on 2/6/2024 when she slipped off of a step, she was seen in urgent care 2/8/2024 and placed in a surgical shoe, she followed up in our office on 2/29/2024 and was placed in a cam boot, she states since being in the cam boot she feels much more stable, no pain or discomfort is noted.          The following portions of the patient's history were reviewed and updated as appropriate: allergies, current medications, past family history, past medical history, past social history, past surgical history, and problem list.        Objective:  /76 (BP Location: Right arm, Patient Position: Sitting, Cuff Size: Adult)   Pulse 81   Ht 5' 1\" (1.549 m) Comment: STATED  Wt 79.8 kg (176 lb) Comment: stated  BMI 33.25 kg/m²     Review of Systems   Constitutional:  Negative for chills and fever.   HENT:  Negative for ear pain and sore throat.    Eyes:  Negative for pain and visual disturbance.   Respiratory:  Negative " for cough and shortness of breath.    Cardiovascular:  Negative for chest pain and palpitations.   Gastrointestinal:  Negative for abdominal pain and vomiting.   Genitourinary:  Negative for dysuria and hematuria.   Musculoskeletal:  Positive for gait problem. Negative for arthralgias and back pain.        Fracture left great toe   Skin: Negative.  Negative for color change and rash.   Neurological:  Negative for seizures and syncope.   Psychiatric/Behavioral: Negative.     All other systems reviewed and are negative.      Physical Exam  Constitutional:       Appearance: Normal appearance. She is well-developed and normal weight.   HENT:      Head: Normocephalic and atraumatic.      Nose: Nose normal.      Mouth/Throat:      Mouth: Mucous membranes are moist.      Pharynx: Oropharynx is clear.   Eyes:      Conjunctiva/sclera: Conjunctivae normal.      Pupils: Pupils are equal, round, and reactive to light.   Cardiovascular:      Pulses:           Dorsalis pedis pulses are 2+ on the right side and 2+ on the left side.        Posterior tibial pulses are 2+ on the right side and 2+ on the left side.   Pulmonary:      Effort: Pulmonary effort is normal.   Musculoskeletal:         General: Normal range of motion.      Cervical back: Normal range of motion.      Right lower leg: No edema.      Left lower leg: No edema.   Feet:      Right foot:      Protective Sensation: 10 sites tested.  10 sites sensed.      Skin integrity: Skin integrity normal.      Toenail Condition: Right toenails are normal.      Left foot:      Protective Sensation: 10 sites tested.  10 sites sensed.      Skin integrity: Skin integrity normal.      Toenail Condition: Left toenails are normal.      Comments: No Tenderness to palpation over the great toe and mildly around the base of the toes.  No ecchymosis around the same areas and also swelling.  Neurovascularly intact distally.  Full active range of motion 5 out of 5 muscle strength of the  "foot/ankle/toes.  Skin:     General: Skin is warm and dry.      Capillary Refill: Capillary refill takes less than 2 seconds.   Neurological:      General: No focal deficit present.      Mental Status: She is alert and oriented to person, place, and time. Mental status is at baseline.   Psychiatric:         Mood and Affect: Mood normal.         Behavior: Behavior normal.         Thought Content: Thought content normal.         Judgment: Judgment normal.                No pertinent results found.      Joana Hirsch DPM, ELYSSA, FACFAS    Portions of the record may have been created with voice recognition software. Occasional wrong word or \"sound a like\" substitutions may have occurred due to the inherent limitations of voice recognition software. Read the chart carefully and recognize, using context, where substitutions have occurred.  "

## 2024-03-15 ENCOUNTER — APPOINTMENT (OUTPATIENT)
Dept: RADIOLOGY | Facility: CLINIC | Age: 49
End: 2024-03-15
Payer: COMMERCIAL

## 2024-03-15 ENCOUNTER — OFFICE VISIT (OUTPATIENT)
Dept: PODIATRY | Facility: CLINIC | Age: 49
End: 2024-03-15

## 2024-03-15 VITALS
WEIGHT: 176 LBS | HEIGHT: 61 IN | HEART RATE: 81 BPM | BODY MASS INDEX: 33.23 KG/M2 | SYSTOLIC BLOOD PRESSURE: 138 MMHG | DIASTOLIC BLOOD PRESSURE: 76 MMHG

## 2024-03-15 DIAGNOSIS — S92.415A NONDISPLACED FRACTURE OF PROXIMAL PHALANX OF LEFT GREAT TOE, INITIAL ENCOUNTER FOR CLOSED FRACTURE: ICD-10-CM

## 2024-03-15 DIAGNOSIS — S92.415D NONDISPLACED FRACTURE OF PROXIMAL PHALANX OF LEFT GREAT TOE, SUBSEQUENT ENCOUNTER FOR FRACTURE WITH ROUTINE HEALING: Primary | ICD-10-CM

## 2024-03-15 PROCEDURE — 73630 X-RAY EXAM OF FOOT: CPT

## 2024-03-15 PROCEDURE — 99024 POSTOP FOLLOW-UP VISIT: CPT | Performed by: PODIATRIST
